# Patient Record
Sex: FEMALE | Race: BLACK OR AFRICAN AMERICAN | NOT HISPANIC OR LATINO | Employment: FULL TIME | URBAN - METROPOLITAN AREA
[De-identification: names, ages, dates, MRNs, and addresses within clinical notes are randomized per-mention and may not be internally consistent; named-entity substitution may affect disease eponyms.]

---

## 2017-06-12 ENCOUNTER — TELEPHONE (OUTPATIENT)
Dept: OBSTETRICS AND GYNECOLOGY | Facility: CLINIC | Age: 37
End: 2017-06-12

## 2017-06-12 NOTE — TELEPHONE ENCOUNTER
Called pt to reschedule her appt to a later time on 6/30 due to Dr Neumann not being available at the provided time.  Pt stated that she rescheduled her appt to August because she is going to be on her cycle around that time.  Confirmed appt.  Pt verbalized understanding.

## 2017-08-09 ENCOUNTER — OFFICE VISIT (OUTPATIENT)
Dept: OBSTETRICS AND GYNECOLOGY | Facility: CLINIC | Age: 37
End: 2017-08-09
Attending: OBSTETRICS & GYNECOLOGY
Payer: COMMERCIAL

## 2017-08-09 VITALS
BODY MASS INDEX: 23.04 KG/M2 | DIASTOLIC BLOOD PRESSURE: 74 MMHG | SYSTOLIC BLOOD PRESSURE: 120 MMHG | HEIGHT: 64 IN | WEIGHT: 134.94 LBS

## 2017-08-09 DIAGNOSIS — Z01.419 VISIT FOR GYNECOLOGIC EXAMINATION: Primary | ICD-10-CM

## 2017-08-09 DIAGNOSIS — Z30.011 ENCOUNTER FOR INITIAL PRESCRIPTION OF CONTRACEPTIVE PILLS: ICD-10-CM

## 2017-08-09 PROCEDURE — 88175 CYTOPATH C/V AUTO FLUID REDO: CPT

## 2017-08-09 PROCEDURE — 87624 HPV HI-RISK TYP POOLED RSLT: CPT

## 2017-08-09 PROCEDURE — 99999 PR PBB SHADOW E&M-EST. PATIENT-LVL III: CPT | Mod: PBBFAC,,, | Performed by: OBSTETRICS & GYNECOLOGY

## 2017-08-09 PROCEDURE — 99395 PREV VISIT EST AGE 18-39: CPT | Mod: S$GLB,,, | Performed by: OBSTETRICS & GYNECOLOGY

## 2017-08-09 RX ORDER — NORETHINDRONE ACETATE AND ETHINYL ESTRADIOL .02; 1 MG/1; MG/1
1 TABLET ORAL DAILY
Qty: 30 TABLET | Refills: 11 | Status: SHIPPED | OUTPATIENT
Start: 2017-08-09 | End: 2018-04-16

## 2017-08-09 NOTE — PROGRESS NOTES
"CC: Well woman exam    Maria Eugenia Briscoe is a 36 y.o. female  presents for a well woman exam.  She has no issues, problems, or complaints.    Past Medical History:   Diagnosis Date    Pregnancy        Past Surgical History:   Procedure Laterality Date     SECTION      MYOMECTOMY      MYOMECTOMY         OB History    Para Term  AB Living   2 1 1   1 1   SAB TAB Ectopic Multiple Live Births   1     0 1      # Outcome Date GA Lbr Patrick/2nd Weight Sex Delivery Anes PTL Lv   2 Term 08/10/15 39w3d  3.05 kg (6 lb 11.6 oz) M CS-LTranv Spinal N PANDA   1 SAB  6w0d                 Family History   Problem Relation Age of Onset    Breast cancer Paternal Aunt     Colon cancer Neg Hx     Ovarian cancer Neg Hx        Social History   Substance Use Topics    Smoking status: Never Smoker    Smokeless tobacco: Never Used    Alcohol use No      Comment: rarely       /74   Ht 5' 4" (1.626 m)   Wt 61.2 kg (134 lb 14.7 oz)   LMP 2017 (Exact Date)   Breastfeeding? No   BMI 23.16 kg/m²     ROS:  GENERAL: Denies weight gain or weight loss. Feeling well overall.   SKIN: Denies rash or lesions.   HEAD: Denies head injury or headache.   NODES: Denies enlarged lymph nodes.   CHEST: Denies chest pain or shortness of breath.   CARDIOVASCULAR: Denies palpitations or left sided chest pain.   ABDOMEN: No abdominal pain, constipation, diarrhea, nausea, vomiting or rectal bleeding.   URINARY: No frequency, dysuria, hematuria, or burning on urination.  REPRODUCTIVE: See HPI.   BREASTS: The patient performs breast self-examination and denies pain, lumps, or nipple discharge.   HEMATOLOGIC: No easy bruisability or excessive bleeding.  MUSCULOSKELETAL: Denies joint pain or swelling.   NEUROLOGIC: Denies syncope or weakness.   PSYCHIATRIC: Denies depression, anxiety or mood swings.    Physical Exam:    APPEARANCE: Well nourished, well developed, in no acute distress.  AFFECT: WNL, alert and " oriented x 3  SKIN: No acne or hirsutism  NECK: Neck symmetric without masses or thyromegaly  NODES: No inguinal, cervical, axillary, or femoral lymph node enlargement  CHEST: Good respiratory effect  ABDOMEN: Soft.  No tenderness or masses.  No hepatosplenomegaly.  No hernias.  BREASTS: Symmetrical, no skin changes or visible lesions.  No palpable masses, nipple discharge bilaterally.  PELVIC: Normal external genitalia without lesions.  Normal hair distribution.  Adequate perineal body, normal urethral meatus.  Vagina moist and well rugated without lesions or discharge.  Cervix pink, without lesions, discharge or tenderness.  No significant cystocele or rectocele.  Bimanual exam shows uterus to be normal size, regular, mobile and nontender.  Adnexa without masses or tenderness.    EXTREMITIES: No edema.    ASSESSMENT AND PLAN  1. Visit for gynecologic examination  Liquid-based pap smear, screening    HPV High Risk Genotypes, PCR   2. Encounter for initial prescription of contraceptive pills  norethindrone-ethinyl estradiol (MICROGESTIN 1/20) 1-20 mg-mcg per tablet       Patient was counseled today on A.C.S. Pap guidelines and recommendations for yearly pelvic exams, pap smears every 5 years with HPV co-testing, and monthly self breast exams; to see her PCP for other health maintenance.     Return in about 1 year (around 8/9/2018).

## 2017-08-14 ENCOUNTER — OFFICE VISIT (OUTPATIENT)
Dept: OPHTHALMOLOGY | Facility: CLINIC | Age: 37
End: 2017-08-14
Payer: COMMERCIAL

## 2017-08-14 DIAGNOSIS — Q14.2 OPTIC DISC ANOMALY: ICD-10-CM

## 2017-08-14 DIAGNOSIS — H40.023 OPEN ANGLE WITH BORDERLINE FINDINGS AND HIGH GLAUCOMA RISK IN BOTH EYES: Primary | ICD-10-CM

## 2017-08-14 PROCEDURE — 92250 FUNDUS PHOTOGRAPHY W/I&R: CPT | Mod: S$GLB,,, | Performed by: OPHTHALMOLOGY

## 2017-08-14 PROCEDURE — 92020 GONIOSCOPY: CPT | Mod: S$GLB,,, | Performed by: OPHTHALMOLOGY

## 2017-08-14 PROCEDURE — 92004 COMPRE OPH EXAM NEW PT 1/>: CPT | Mod: S$GLB,,, | Performed by: OPHTHALMOLOGY

## 2017-08-14 PROCEDURE — 99999 PR PBB SHADOW E&M-EST. PATIENT-LVL II: CPT | Mod: PBBFAC,,, | Performed by: OPHTHALMOLOGY

## 2017-08-14 NOTE — PROGRESS NOTES
HPI     Pt states she seen an eye doctor last week at [facility] and was   referred to see Dr. May.   Pt states the [facility] eye doctor told her that her vision has   decreased and she failed her HVF test.   Pt states she seen Dr. May a while back probably in the 90's for   Ocular hypertension and was put on eye drops but a previous eye doctor   that she has seen told her she didn't need to be on any eye drops.   Pt states she did have a baby 8/10/15 and wasn't sure if this had anything   to do with her vision loss.   Pt states she was prescribed glasses but they haven't came in yet.         Last edited by Shaniqua May MD on 8/14/2017 11:33 AM. (History)            Assessment /Plan     For exam results, see Encounter Report.    Open angle with borderline findings and high glaucoma risk in both eyes    Optic disc anomaly      PT WAS SEEN HERE AT [facility] - (PRE 2004) - AS A GLAUCOMA SUSPECT AT ABOUT 14 YEARS OF AGE  NO TREATMENT WAS STARTED AT THAT TIME   SHE RE-PRESENTS AS A GLAUCOMA SUSPECT TODAY   SHE IS NOW 36 AND HAS A 2 YEAR OLD CHILD     Glaucoma suspect 2/2 large CDR   Seen as a teenager ( about age 14 ) - and there  May be some old slides in storage    Re-presents as continued suspect     CDR 0.85 - thin inf ou  Large disc size   + FmHx - grand mother   ? There may be an old HVF in the perimetry machine   -can try and find old photos in storage - but may not be possible     pland   Disc photos today   F/u with HVF / OCT - 1-2-3 months   Will NOT begin treatment at this time - monitor

## 2017-08-15 LAB
HPV HR 12 DNA CVX QL NAA+PROBE: NEGATIVE
HPV16 DNA SPEC QL NAA+PROBE: NEGATIVE
HPV18 DNA SPEC QL NAA+PROBE: NEGATIVE

## 2017-12-01 ENCOUNTER — CLINICAL SUPPORT (OUTPATIENT)
Dept: OPHTHALMOLOGY | Facility: CLINIC | Age: 37
End: 2017-12-01
Attending: OPHTHALMOLOGY
Payer: COMMERCIAL

## 2017-12-01 DIAGNOSIS — H40.023 OPEN ANGLE WITH BORDERLINE FINDINGS AND HIGH GLAUCOMA RISK IN BOTH EYES: ICD-10-CM

## 2017-12-01 DIAGNOSIS — Z83.511 FAMILY HISTORY OF GLAUCOMA: ICD-10-CM

## 2017-12-01 DIAGNOSIS — H40.1131 PRIMARY OPEN-ANGLE GLAUCOMA, BILATERAL, MILD STAGE: Primary | ICD-10-CM

## 2017-12-01 DIAGNOSIS — Q14.2 OPTIC DISC ANOMALY: ICD-10-CM

## 2017-12-01 PROCEDURE — 99999 PR PBB SHADOW E&M-EST. PATIENT-LVL I: CPT | Mod: PBBFAC,,, | Performed by: OPHTHALMOLOGY

## 2017-12-01 PROCEDURE — 92083 EXTENDED VISUAL FIELD XM: CPT | Mod: S$GLB,,, | Performed by: OPHTHALMOLOGY

## 2017-12-01 PROCEDURE — 92133 CPTRZD OPH DX IMG PST SGM ON: CPT | Mod: S$GLB,,, | Performed by: OPHTHALMOLOGY

## 2017-12-01 PROCEDURE — 92012 INTRM OPH EXAM EST PATIENT: CPT | Mod: S$GLB,,, | Performed by: OPHTHALMOLOGY

## 2017-12-01 RX ORDER — LATANOPROST 50 UG/ML
1 SOLUTION/ DROPS OPHTHALMIC DAILY
Qty: 2.5 ML | Refills: 12 | Status: SHIPPED | OUTPATIENT
Start: 2017-12-01 | End: 2018-09-05

## 2017-12-01 NOTE — PROGRESS NOTES
HPI     Glaucoma    Additional comments: HVF and OCT review today           Comments   DLS: 8/14/17    1. POAG  2. Large CDR       Last edited by Tammi Aldana MA on 12/1/2017 11:00 AM. (History)            Assessment /Plan     For exam results, see Encounter Report.    Primary open-angle glaucoma, bilateral, mild stage  -     latanoprost 0.005 % ophthalmic solution; Place 1 drop into both eyes once daily.  Dispense: 2.5 mL; Refill: 12  -     [facility] Retina Tomography (HRT) - OU - Both Eyes; Future    Optic disc anomaly    Family history of glaucoma        PT WAS SEEN HERE AT [facility] - (PRE 2004) - AS A GLAUCOMA SUSPECT AT ABOUT 14 YEARS OF AGE  NO TREATMENT WAS STARTED AT THAT TIME   SHE RE-PRESENTS AS A GLAUCOMA SUSPECT TODAY   SHE IS NOW 36 AND HAS A 2 YEAR OLD CHILD      Glaucoma suspect 2/2 large CDR   Seen as a teenager ( about age 14 ) - and there  May be some old slides in storage     Re-presents as continued suspect        Glaucoma (type and duration)    Followed as a supspect since 1999 (age 14)   First HVF   1999   First photos   ?   Treatment / Drops started   Drops starte 12/2017 at age 37           Family history    + grandmother         Glaucoma meds    Latanoprost started 12/2017        H/O adverse rxn to glaucoma drops    none        LASERS    none        GLAUCOMA SURGERIES    none        OTHER EYE SURGERIES    none        CDR    0.85/0.85 thin inf        Tbase    18-22 / 19-24          Tmax    22/24            Ttarget    ?             HVF    4 test 1999  to  2002 - near nl  od // near nl  Os    more recently - 1 test 2017 to 2017 - near nl od // ? Hint SNS        Gonio    +3 ou         CCT    585/590        OCT    1 test 2017 to 2017 - RNFL - dec. S od // dec. I os        HRT    ? test 20? to 20? - MR - ? od // ? os        Disc photos    8/2017    - Ttoday    22/24  - Test done today    HVF / OCT        PLAN   Suspect has POAG - mild ou   Begin latanoprost ou   ++ OCT changes and IOP creeping up      F/U 2-3 months with IOP check on latanoprost and HRT and gonio   Will ask photography if they can find any old photos in storage from 1999 to 2002

## 2018-01-04 ENCOUNTER — TELEPHONE (OUTPATIENT)
Dept: OPHTHALMOLOGY | Facility: CLINIC | Age: 38
End: 2018-01-04

## 2018-03-05 ENCOUNTER — OFFICE VISIT (OUTPATIENT)
Dept: OPHTHALMOLOGY | Facility: CLINIC | Age: 38
End: 2018-03-05
Payer: COMMERCIAL

## 2018-03-05 ENCOUNTER — CLINICAL SUPPORT (OUTPATIENT)
Dept: OPHTHALMOLOGY | Facility: CLINIC | Age: 38
End: 2018-03-05
Payer: COMMERCIAL

## 2018-03-05 DIAGNOSIS — Q14.2 OPTIC DISC ANOMALY: ICD-10-CM

## 2018-03-05 DIAGNOSIS — Z83.511 FAMILY HISTORY OF GLAUCOMA: ICD-10-CM

## 2018-03-05 DIAGNOSIS — H40.1131 PRIMARY OPEN-ANGLE GLAUCOMA, BILATERAL, MILD STAGE: ICD-10-CM

## 2018-03-05 DIAGNOSIS — H47.233 OPTIC CUPPING OF BOTH EYES: ICD-10-CM

## 2018-03-05 DIAGNOSIS — H40.1131 PRIMARY OPEN-ANGLE GLAUCOMA, BILATERAL, MILD STAGE: Primary | ICD-10-CM

## 2018-03-05 PROCEDURE — 99999 PR PBB SHADOW E&M-EST. PATIENT-LVL II: CPT | Mod: PBBFAC,,, | Performed by: OPHTHALMOLOGY

## 2018-03-05 PROCEDURE — 92014 COMPRE OPH EXAM EST PT 1/>: CPT | Mod: S$GLB,,, | Performed by: OPHTHALMOLOGY

## 2018-03-05 PROCEDURE — 92134 CPTRZ OPH DX IMG PST SGM RTA: CPT | Mod: S$GLB,,, | Performed by: OPHTHALMOLOGY

## 2018-03-05 NOTE — PROGRESS NOTES
HPI     DLS: 12/01/17    Pt here for HRT review;    Meds: Latanoprost qhs ou    1. POAG   2. Large CDR         Last edited by Africa Bruner on 3/5/2018 11:08 AM. (History)            Assessment /Plan     For exam results, see Encounter Report.    Primary open-angle glaucoma, bilateral, mild stage    Optic cupping of both eyes    Optic disc anomaly    Family history of glaucoma          PT WAS SEEN HERE AT [facility] - (PRE 2004) - AS A GLAUCOMA SUSPECT AT ABOUT 14 YEARS OF AGE  NO TREATMENT WAS STARTED AT THAT TIME   SHE RE-PRESENTS AS A GLAUCOMA SUSPECT TODAY   SHE IS NOW 36 AND HAS A 2 YEAR OLD CHILD      1. Glaucoma suspect 2/2 large CDR   Seen as a teenager ( about age 14 ) - and there  May be some old slides in storage     Re-presents as continued suspect        Glaucoma (type and duration)    Followed as a supspect since 1999 (age 14)   First HVF   1999   First photos   ?   Treatment / Drops started   Drops starte 12/2017 at age 37           Family history    + grandmother         Glaucoma meds    Latanoprost started 12/2017        H/O adverse rxn to glaucoma drops    none        LASERS    none        GLAUCOMA SURGERIES    none        OTHER EYE SURGERIES    none        CDR    0.85/0.85 thin inf        Tbase    18-22 / 19-24          Tmax    22/24            Ttarget    17/17             HVF    4 test 1999  to  2002 - near nl  od // near nl  Os    more recently - 1 test 2017 to 2017 - near nl od // ? Hint SNS        Gonio    +3 ou         CCT    585/590        OCT    1 test 2017 to 2017 - RNFL - dec. S od // dec. I os        HRT    1 test 2018 to 2018 -  MR -  Dec. Thru out  od // dec. S/N/I os /// CDR 0.87  od // 0.84 os /   // Lrg disc area on HRT  4.2od and 4.2 os (nl is 1/7 to 2.8)        Disc photos    8/2017    - Ttoday    16/16 (down from 22/24 off gtts)   - Test done today    HVF / OCT      2. Lrg disc area    Anomalous disc     PLAN    POAG - mild ou vs suspect   lrg CDR - since childhood  lrg Optic disc  area   abnl OCT  abnl HRT   Cont  latanoprost ou   Good resp to latanoprost 22/24 --> 16 ou     F/U 4 months with gonio and IOP check // glaucoma follow up     Will ask photography if they can find any old photos in storage from 1999 to 2002

## 2018-03-21 ENCOUNTER — TELEPHONE (OUTPATIENT)
Dept: OBSTETRICS AND GYNECOLOGY | Facility: CLINIC | Age: 38
End: 2018-03-21

## 2018-03-21 DIAGNOSIS — Z32.01 POSITIVE URINE PREGNANCY TEST: Primary | ICD-10-CM

## 2018-03-21 NOTE — TELEPHONE ENCOUNTER
Returned the pt's call to the clinic. Informed the pt that Dr. Neumann isn't taking any newly pregnant patients and that a message will be sent to her to inquire if she can see the pt as a new ob. Pt verbalized understanding.      Can you see this pt as a new ob?

## 2018-03-21 NOTE — TELEPHONE ENCOUNTER
Returned the pt's call to the clinic. No answer, left VM message for the pt to call the clinic back.      Can you see this pt as a new ob?

## 2018-03-21 NOTE — TELEPHONE ENCOUNTER
----- Message from Oscar Saenz sent at 3/21/2018  8:56 AM CDT -----  Please call new ob pt she has a appt on 05/03 to confirm her pregnancy will like to schedule a appt sooner [phone] or [phone]

## 2018-03-21 NOTE — TELEPHONE ENCOUNTER
Contacted the pt to inform her that Dr. Neumann will see her for her new pregnancy and that she can schedule her appointments up until June. Pt scheduled appointments up until June and informed that letter reminders will be sent out. Pt verbalized understanding. Letter reminders sent out.

## 2018-03-21 NOTE — TELEPHONE ENCOUNTER
----- Message from Layla Barboza sent at 3/21/2018  9:20 AM CDT -----  _  1st Request  X_  2nd Request  _  3rd Request        Who: CHANDLER MELCHOR [[id number]]    Why: Pt is returning a call from the office.Please return the call at earliest convenience. Thanks!    What Number to Call Back:[phone]      When to Expect a call back: (Within 24 hours)

## 2018-03-28 ENCOUNTER — HOSPITAL ENCOUNTER (EMERGENCY)
Facility: OTHER | Age: 38
Discharge: HOME | End: 2018-03-28
Attending: EMERGENCY MEDICINE
Payer: COMMERCIAL

## 2018-03-28 VITALS
DIASTOLIC BLOOD PRESSURE: 56 MMHG | TEMPERATURE: 98 F | BODY MASS INDEX: 22.36 KG/M2 | HEART RATE: 94 BPM | SYSTOLIC BLOOD PRESSURE: 117 MMHG | WEIGHT: 131 LBS | HEIGHT: 64 IN | RESPIRATION RATE: 16 BRPM | OXYGEN SATURATION: 100 %

## 2018-03-28 DIAGNOSIS — O20.9 BLEEDING IN EARLY PREGNANCY: Primary | ICD-10-CM

## 2018-03-28 DIAGNOSIS — O20.0 THREATENED ABORTION: ICD-10-CM

## 2018-03-28 LAB
ABO + RH BLD: NORMAL
ALBUMIN SERPL BCP-MCNC: 3.9 G/DL
ALP SERPL-CCNC: 44 U/L
ALT SERPL W/O P-5'-P-CCNC: 11 U/L
ANION GAP SERPL CALC-SCNC: 9 MMOL/L
AST SERPL-CCNC: 19 U/L
B-HCG UR QL: POSITIVE
BACTERIA #/AREA URNS HPF: ABNORMAL /HPF
BASOPHILS # BLD AUTO: 0.06 K/UL
BASOPHILS NFR BLD: 0.6 %
BILIRUB SERPL-MCNC: 0.3 MG/DL
BILIRUB UR QL STRIP: NEGATIVE
BLD GP AB SCN CELLS X3 SERPL QL: NORMAL
BUN SERPL-MCNC: 10 MG/DL
CALCIUM SERPL-MCNC: 9.1 MG/DL
CHLORIDE SERPL-SCNC: 105 MMOL/L
CLARITY UR: CLEAR
CO2 SERPL-SCNC: 24 MMOL/L
COLOR UR: YELLOW
CREAT SERPL-MCNC: 0.6 MG/DL
CTP QC/QA: YES
DIFFERENTIAL METHOD: ABNORMAL
EOSINOPHIL # BLD AUTO: 0.1 K/UL
EOSINOPHIL NFR BLD: 0.9 %
ERYTHROCYTE [DISTWIDTH] IN BLOOD BY AUTOMATED COUNT: 12.8 %
EST. GFR  (AFRICAN AMERICAN): >60 ML/MIN/1.73 M^2
EST. GFR  (NON AFRICAN AMERICAN): >60 ML/MIN/1.73 M^2
GLUCOSE SERPL-MCNC: 105 MG/DL
GLUCOSE UR QL STRIP: NEGATIVE
HCG INTACT+B SERPL-ACNC: NORMAL MIU/ML
HCT VFR BLD AUTO: 33.9 %
HGB BLD-MCNC: 11.3 G/DL
HGB UR QL STRIP: ABNORMAL
KETONES UR QL STRIP: ABNORMAL
LEUKOCYTE ESTERASE UR QL STRIP: NEGATIVE
LYMPHOCYTES # BLD AUTO: 1.8 K/UL
LYMPHOCYTES NFR BLD: 18.3 %
MCH RBC QN AUTO: 30.9 PG
MCHC RBC AUTO-ENTMCNC: 33.3 G/DL
MCV RBC AUTO: 93 FL
MICROSCOPIC COMMENT: ABNORMAL
MONOCYTES # BLD AUTO: 0.6 K/UL
MONOCYTES NFR BLD: 5.6 %
NEUTROPHILS # BLD AUTO: 7.3 K/UL
NEUTROPHILS NFR BLD: 74.3 %
NITRITE UR QL STRIP: NEGATIVE
PH UR STRIP: 7 [PH] (ref 5–8)
PLATELET # BLD AUTO: 245 K/UL
PMV BLD AUTO: 10.2 FL
POTASSIUM SERPL-SCNC: 3.6 MMOL/L
PROT SERPL-MCNC: 8.1 G/DL
PROT UR QL STRIP: NEGATIVE
RBC # BLD AUTO: 3.66 M/UL
RBC #/AREA URNS HPF: 22 /HPF (ref 0–4)
SODIUM SERPL-SCNC: 138 MMOL/L
SP GR UR STRIP: 1.02 (ref 1–1.03)
SQUAMOUS #/AREA URNS HPF: 5 /HPF
URN SPEC COLLECT METH UR: ABNORMAL
UROBILINOGEN UR STRIP-ACNC: 1 EU/DL
WBC # BLD AUTO: 9.88 K/UL

## 2018-03-28 PROCEDURE — 85025 COMPLETE CBC W/AUTO DIFF WBC: CPT

## 2018-03-28 PROCEDURE — 84702 CHORIONIC GONADOTROPIN TEST: CPT

## 2018-03-28 PROCEDURE — 86850 RBC ANTIBODY SCREEN: CPT

## 2018-03-28 PROCEDURE — 99284 EMERGENCY DEPT VISIT MOD MDM: CPT | Mod: 25

## 2018-03-28 PROCEDURE — 81025 URINE PREGNANCY TEST: CPT | Performed by: PHYSICIAN ASSISTANT

## 2018-03-28 PROCEDURE — 80053 COMPREHEN METABOLIC PANEL: CPT

## 2018-03-28 PROCEDURE — 81000 URINALYSIS NONAUTO W/SCOPE: CPT

## 2018-03-28 NOTE — ED NOTES
"Pt to ED reporting she is 5 weeks pregnant, reporting vaginal spotting began today with dark red blood, unable to quantify amount. Reporting mild abdominal cramping stating "but I think its related to pregnancy pain." Denies back pain, SOB, or CP. Pt AAOx4 and appropriate at this time. Respirations even and unlabored. No acute distress noted.   "

## 2018-03-28 NOTE — ED PROVIDER NOTES
"Encounter Date: 3/28/2018       History     Chief Complaint   Patient presents with    Vaginal Bleeding     Pt reports + 5 wks pregnant new onset " dark colored" vaginal bleeding w/ new onset this AM. + intermittent "normal" pelvic cramping. Denies N/V     37-year-old female with history of glaucoma who is  A1 at approximately 5 weeks gestation presents emergency department with complaints of spotting in pregnancy.  She states it started this morning.  She reports some mild pelvic cramping.  She reports some mild nausea but denies vomiting or diarrhea.  She denies urinary symptoms.  No current treatment for his symptoms.  She is scheduled to follow-up with GYN on .      The history is provided by the patient.     Review of patient's allergies indicates:  No Known Allergies  Past Medical History:   Diagnosis Date    Glaucoma     Pregnancy      Past Surgical History:   Procedure Laterality Date     SECTION      MYOMECTOMY      MYOMECTOMY  2010     Family History   Problem Relation Age of Onset    Breast cancer Paternal Aunt     Glaucoma Father     Glaucoma Paternal Grandmother     Colon cancer Neg Hx     Ovarian cancer Neg Hx     Amblyopia Neg Hx     Blindness Neg Hx     Cataracts Neg Hx     Macular degeneration Neg Hx     Retinal detachment Neg Hx     Strabismus Neg Hx      Social History   Substance Use Topics    Smoking status: Never Smoker    Smokeless tobacco: Never Used    Alcohol use No      Comment: rarely     Review of Systems   Constitutional: Negative for chills and fever.   HENT: Negative for sore throat.    Respiratory: Negative for shortness of breath.    Cardiovascular: Negative for chest pain.   Gastrointestinal: Positive for nausea. Negative for abdominal pain, diarrhea and vomiting.   Genitourinary: Positive for vaginal bleeding. Negative for difficulty urinating, dysuria, flank pain, frequency, hematuria and urgency.   Musculoskeletal: Negative for back pain. "   Skin: Negative for rash.   Neurological: Negative for weakness.   Hematological: Does not bruise/bleed easily.       Physical Exam     Initial Vitals [03/28/18 1518]   BP Pulse Resp Temp SpO2   (!) 115/58 89 16 98.2 °F (36.8 °C) 100 %      MAP       77         Physical Exam    Nursing note and vitals reviewed.  Constitutional: Vital signs are normal. She appears well-developed and well-nourished. She is not diaphoretic.  Non-toxic appearance. No distress.   HENT:   Head: Normocephalic and atraumatic.   Right Ear: External ear normal.   Left Ear: External ear normal.   Nose: Nose normal.   Eyes: Conjunctivae, EOM and lids are normal. Pupils are equal, round, and reactive to light. No scleral icterus.   Neck: Normal range of motion and phonation normal. Neck supple.   Abdominal: Soft. Normal appearance and bowel sounds are normal. She exhibits no distension. There is no tenderness. There is no rigidity, no rebound, no guarding, no CVA tenderness, no tenderness at McBurney's point and negative Domingo's sign.   Genitourinary: Pelvic exam was performed with patient supine. There is no rash or tenderness on the right labia. There is no rash or tenderness on the left labia. There is bleeding (scant dried blood in vaginal vault) in the vagina.   Musculoskeletal: Normal range of motion.   No obvious deformities, moving all extremities, normal gait   Neurological: She is alert and oriented to person, place, and time. She has normal strength. No sensory deficit.   Skin: Skin is warm, dry and intact. No lesion and no rash noted. No erythema.   Psychiatric: She has a normal mood and affect. Her speech is normal and behavior is normal. Judgment normal. Cognition and memory are normal.         ED Course   Procedures  Labs Reviewed   CBC W/ AUTO DIFFERENTIAL - Abnormal; Notable for the following:        Result Value    RBC 3.66 (*)     Hemoglobin 11.3 (*)     Hematocrit 33.9 (*)     Gran% 74.3 (*)     All other components within  normal limits   COMPREHENSIVE METABOLIC PANEL - Abnormal; Notable for the following:     Alkaline Phosphatase 44 (*)     All other components within normal limits   URINALYSIS - Abnormal; Notable for the following:     Ketones, UA 1+ (*)     Occult Blood UA 3+ (*)     All other components within normal limits   URINALYSIS MICROSCOPIC - Abnormal; Notable for the following:     RBC, UA 22 (*)     All other components within normal limits   POCT URINE PREGNANCY - Abnormal; Notable for the following:     POC Preg Test, Ur Positive (*)     All other components within normal limits   HCG, QUANTITATIVE, PREGNANCY   TYPE & SCREEN              Imaging Results          US OB Less Than 14 Wks with Transvag(xpd (Final result)  Result time 03/28/18 17:44:15    Final result by Bc So MD (03/28/18 17:44:15)                 Impression:      Single intrauterine gestational sac with an estimated age of 6 weeks and 3 days, containing only a small yolk sac, which may be secondary to very early gestation.  Follow-up with serial beta HCG and pelvic ultrasound as clinically warranted.    Probable small subchorionic hematoma without significant mass effect.      Electronically signed by: Bc So MD  Date:    03/28/2018  Time:    17:44             Narrative:    EXAMINATION:  US OB LESS THAN 14 WKS WITH TRANSVAGINAL (XPD)    CLINICAL HISTORY:  Vag Bleeding;    TECHNIQUE:  Transabdominal sonography of the pelvis was performed, followed by transvaginal sonography to better evaluate the uterus and ovaries.    COMPARISON:  Pelvic ultrasound 12/17/2014    FINDINGS:  Intrauterine gestation(s): Single    Mean gestational sac diameter: 1.6 cm    Yolk sac: 3-4 mm    No fetal pole identified.    Subchorionic hemorrhage: There is a 0.6 x 0.4 x 0.5 cm irregular hypoechoic collection near the gestational sac suggestive of a small subchorionic hematoma.    Right ovary: 3.7 x 2.1 x 2.3 cm with intact arterial and venous flow.    Left ovary:  3.1 x 3.8 x 2.4 cm with intact arterial and venous flow containing a 2.7 cm probable corpus luteum.    Miscellaneous: No free fluid within the cul de sac or Morison's pouch.                                Medical Decision Making:   History:   Old Medical Records: I decided to obtain old medical records.  Initial Assessment:   37-year-old female with complaints consistent with vaginal bleeding in early pregnancy concerning for threatened .  Vital signs stable, afebrile, neurovascularly intact.  She is alert, healthy and nontoxic appearing.  She is in no apparent distress.  No focal neurological deficits.  Exam documented above.  Clinical Tests:   Lab Tests: Ordered and Reviewed  Radiological Study: Ordered and Reviewed  ED Management:  Workup obtained to rule out ectopic pregnancy.  H&H stable at 11.3 and 33.9.  [place] [address].  She is A+ and does not require RhoGAM.  Ultrasound consistent with single intrauterine gestational sac with an estimated age of 6 weeks 3 days containing a small yolk sac.  She does have a probable small subchorionic hematoma without mass effect.  She is stable will be discharged home with care instructions.  She is to follow-up with OB/GYN in the next 48 hours or return for any worsening signs or symptoms.  She states understanding.  This patient was discussed with the attending physician who agrees with treatment plan  Other:   I have discussed this case with another health care provider.       <> Summary of the Discussion: [place]  This note was created using Dragon Medical dictation.  There may be typographical errors secondary to dictation.                        Clinical Impression:     1. Bleeding in early pregnancy    2. Threatened           Disposition:   Disposition: Discharged  Condition: Stable                        Franchesca Lowery PA-C  [phone]

## 2018-03-29 ENCOUNTER — TELEPHONE (OUTPATIENT)
Dept: OBSTETRICS AND GYNECOLOGY | Facility: CLINIC | Age: 38
End: 2018-03-29

## 2018-03-29 NOTE — TELEPHONE ENCOUNTER
----- Message from Mali Rao sent at 3/29/2018  8:26 AM CDT -----  Contact: self  OB pt 5 weeks, pt went to the ER last night and needing a follow up appt, pt can be reached at [phone].

## 2018-03-29 NOTE — TELEPHONE ENCOUNTER
Pt was seen in [facility] ER for bleeding. Pt stated that it was moderate spotting and was informed by the ER physician to follow up with Dr. Neumann in two days. Informed the pt that Dr. Neumann is out of the office until Wednesday 4/4 and that a message will be sent to her for her recommendations. Pt informed that she will be contacted once Dr. Neumann responds. Pt verbalized understanding.      Any recommendations for this patient?

## 2018-03-30 ENCOUNTER — PATIENT MESSAGE (OUTPATIENT)
Dept: OBSTETRICS AND GYNECOLOGY | Facility: CLINIC | Age: 38
End: 2018-03-30

## 2018-03-31 NOTE — TELEPHONE ENCOUNTER
I have reviewed the ED chart.  Patient doesn't need to be seen.  Message sent through [facility].  Please call her to make sure she read it and see if she has any questions.

## 2018-04-02 NOTE — TELEPHONE ENCOUNTER
Spoke with pt. Pt has read the [facility] message. No further questions or concerns at this time will CB if needed

## 2018-04-16 ENCOUNTER — PROCEDURE VISIT (OUTPATIENT)
Dept: OBSTETRICS AND GYNECOLOGY | Facility: CLINIC | Age: 38
End: 2018-04-16
Attending: OBSTETRICS & GYNECOLOGY
Payer: COMMERCIAL

## 2018-04-16 ENCOUNTER — PATIENT MESSAGE (OUTPATIENT)
Dept: OBSTETRICS AND GYNECOLOGY | Facility: CLINIC | Age: 38
End: 2018-04-16

## 2018-04-16 ENCOUNTER — LAB VISIT (OUTPATIENT)
Dept: LAB | Facility: OTHER | Age: 38
End: 2018-04-16
Payer: COMMERCIAL

## 2018-04-16 ENCOUNTER — OFFICE VISIT (OUTPATIENT)
Dept: OBSTETRICS AND GYNECOLOGY | Facility: CLINIC | Age: 38
End: 2018-04-16
Payer: COMMERCIAL

## 2018-04-16 VITALS
SYSTOLIC BLOOD PRESSURE: 122 MMHG | DIASTOLIC BLOOD PRESSURE: 70 MMHG | BODY MASS INDEX: 23.6 KG/M2 | HEIGHT: 64 IN | WEIGHT: 138.25 LBS

## 2018-04-16 DIAGNOSIS — N91.2 AMENORRHEA: Primary | ICD-10-CM

## 2018-04-16 DIAGNOSIS — Z98.890 HISTORY OF MYOMECTOMY: ICD-10-CM

## 2018-04-16 DIAGNOSIS — Z36.89 ESTABLISH GESTATIONAL AGE, ULTRASOUND: ICD-10-CM

## 2018-04-16 DIAGNOSIS — Z32.01 POSITIVE PREGNANCY TEST: ICD-10-CM

## 2018-04-16 DIAGNOSIS — N92.6 MISSED MENSES: ICD-10-CM

## 2018-04-16 DIAGNOSIS — Z98.891 HISTORY OF CESAREAN SECTION: ICD-10-CM

## 2018-04-16 DIAGNOSIS — N91.2 AMENORRHEA: ICD-10-CM

## 2018-04-16 DIAGNOSIS — O36.80X0 ENCOUNTER TO DETERMINE FETAL VIABILITY OF PREGNANCY, SINGLE OR UNSPECIFIED FETUS: ICD-10-CM

## 2018-04-16 LAB
ABO + RH BLD: NORMAL
ANION GAP SERPL CALC-SCNC: 9 MMOL/L
B-HCG UR QL: POSITIVE
BASOPHILS # BLD AUTO: 0.03 K/UL
BASOPHILS NFR BLD: 0.3 %
BLD GP AB SCN CELLS X3 SERPL QL: NORMAL
BUN SERPL-MCNC: 9 MG/DL
CALCIUM SERPL-MCNC: 9.4 MG/DL
CHLORIDE SERPL-SCNC: 104 MMOL/L
CO2 SERPL-SCNC: 23 MMOL/L
CREAT SERPL-MCNC: 0.6 MG/DL
CTP QC/QA: YES
DIFFERENTIAL METHOD: ABNORMAL
EOSINOPHIL # BLD AUTO: 0.1 K/UL
EOSINOPHIL NFR BLD: 0.6 %
ERYTHROCYTE [DISTWIDTH] IN BLOOD BY AUTOMATED COUNT: 13 %
EST. GFR  (AFRICAN AMERICAN): >60 ML/MIN/1.73 M^2
EST. GFR  (NON AFRICAN AMERICAN): >60 ML/MIN/1.73 M^2
GLUCOSE SERPL-MCNC: 79 MG/DL
HCT VFR BLD AUTO: 35.2 %
HGB BLD-MCNC: 11.9 G/DL
LYMPHOCYTES # BLD AUTO: 1.6 K/UL
LYMPHOCYTES NFR BLD: 14.7 %
MCH RBC QN AUTO: 32.1 PG
MCHC RBC AUTO-ENTMCNC: 33.8 G/DL
MCV RBC AUTO: 95 FL
MONOCYTES # BLD AUTO: 0.5 K/UL
MONOCYTES NFR BLD: 4.6 %
NEUTROPHILS # BLD AUTO: 8.6 K/UL
NEUTROPHILS NFR BLD: 79.7 %
PLATELET # BLD AUTO: 253 K/UL
PMV BLD AUTO: 10.7 FL
POTASSIUM SERPL-SCNC: 3.4 MMOL/L
RBC # BLD AUTO: 3.71 M/UL
SODIUM SERPL-SCNC: 136 MMOL/L
WBC # BLD AUTO: 10.78 K/UL

## 2018-04-16 PROCEDURE — 99213 OFFICE O/P EST LOW 20 MIN: CPT | Mod: 25,S$GLB,, | Performed by: NURSE PRACTITIONER

## 2018-04-16 PROCEDURE — 86592 SYPHILIS TEST NON-TREP QUAL: CPT

## 2018-04-16 PROCEDURE — 80048 BASIC METABOLIC PNL TOTAL CA: CPT

## 2018-04-16 PROCEDURE — 87491 CHLMYD TRACH DNA AMP PROBE: CPT

## 2018-04-16 PROCEDURE — 36415 COLL VENOUS BLD VENIPUNCTURE: CPT

## 2018-04-16 PROCEDURE — 85025 COMPLETE CBC W/AUTO DIFF WBC: CPT

## 2018-04-16 PROCEDURE — 81025 URINE PREGNANCY TEST: CPT | Mod: S$GLB,,, | Performed by: NURSE PRACTITIONER

## 2018-04-16 PROCEDURE — 87340 HEPATITIS B SURFACE AG IA: CPT

## 2018-04-16 PROCEDURE — 87086 URINE CULTURE/COLONY COUNT: CPT

## 2018-04-16 PROCEDURE — 76817 TRANSVAGINAL US OBSTETRIC: CPT | Mod: S$GLB,,, | Performed by: OBSTETRICS & GYNECOLOGY

## 2018-04-16 PROCEDURE — 86762 RUBELLA ANTIBODY: CPT

## 2018-04-16 PROCEDURE — 86703 HIV-1/HIV-2 1 RESULT ANTBDY: CPT

## 2018-04-16 PROCEDURE — 86901 BLOOD TYPING SEROLOGIC RH(D): CPT

## 2018-04-16 PROCEDURE — 99999 PR PBB SHADOW E&M-EST. PATIENT-LVL III: CPT | Mod: PBBFAC,,, | Performed by: NURSE PRACTITIONER

## 2018-04-16 NOTE — PROGRESS NOTES
CC: Positive Pregnancy Test    HISTORY OF PRESENT ILLNESS:    Maria Eugenia Briscoe is a 37 y.o. female, ,  Presents today for a routine exam complaining of amenorrhea and positive home urine pregnancy test. LMP 18. Reports nausea but no vomiting. Reports breast tenderness. Denies pelvic pain. No vaginal bleeding. Prior c/s for hx of myomectomy. Reports not tolerating pelvic exams very well. Has ultrasound after this appointment. Needs prenatal vitamin    SAB x 1  C/s x 1 in 2015- BOY    LMP: 18  EGA: 8w2d  EDC: 18    ROS:  GENERAL: No weight changes. No swelling. No fatigue. No fever.  CARDIOVASCULAR: No chest pain. No shortness of breath. No leg cramps.   NEUROLOGICAL: No headaches. No vision changes.  BREASTS: No pain. No lumps. No discharge.  ABDOMEN: No pain. No diarrhea. No constipation.  REPRODUCTIVE: No abnormal bleeding.   VULVA: No pain. No lesions. No itching.  VAGINA: No relaxation. No itching. No odor. No discharge. No lesions.  URINARY: No incontinence. No nocturia. No frequency. No dysuria.    MEDICATIONS AND ALLERGIES:  Reviewed    COMPREHENSIVE GYN HISTORY:  PAP History: Denies abnormal Paps.  Infection History: Denies STDs. Denies PID.  Benign History: Denies uterine fibroids. Denies ovarian cysts. Denies endometriosis. Denies other conditions.  Cancer History: Denies cervical cancer. Denies uterine cancer or hyperplasia. Denies ovarian cancer. Denies vulvar cancer or pre-cancer. Denies vaginal cancer or pre-cancer. Denies breast cancer. Denies colon cancer.  Sexual Activity History: Reports currently being sexually active  Menstrual History: None.  Contraception: None    There were no vitals taken for this visit.    PE:  AFFECT: Calm, alert and oriented X 3. Interactive during exam  GENERAL: Appears well-nourished, well-developed, in no acute distress.  HEAD: Normocephalic, atruamatic  TEETH: Good dentition.  THYROID: No thyromegally   BREASTS: No masses, skin changes,  nipple discharge or adenopathy bilaterally.  SKIN: Normal for race, warm, & dry. No lesions or rashes.  LUNGS: Easy and unlabored, clear to auscultation bilaterally.  HEART: Regular rate and rhythm   ABDOMEN: Soft and nontender without masses or organomegally.  VULVA: No lesions, masses or tenderness.  VAGINA: Moist and well rugated without lesions or discharge.  CERVIX: Moist and pink without lesions, discharge or tenderness.      UTERUS SIZE: Pt unable to tolerate SVE.   EXTREMITIES: No cyanosis, clubbing or edema. No calf tenderness.  LYMPH NODES: No axillary or inguinal adenopathy.    PROCEDURES:  UPT Positive  Genprobe  Pap current- 2017 WNL    ASSESSMENT/PLAN:  Amenorrhea  Positive urine pregnancy test (NAVARRO: 18, EGA: 8w2d based on LMP)    -  Routine prenatal care    Nausea and vomiting in pregnancy    -  Education regarding lifestyle and dietary modifications    -  Advised use of B6/Unisom. Pt will notify us if no relief/worsening symptoms, will consider Zofran if needed.      1st TRIMESTER COUNSELING: Discussed all, booklet provided:  Common complaints of pregnancy  HIV and other routine prenatal tests including  genetic screening  Risk factors identified by prenatal history  Oriented to practice - discussed anticipated course of prenatal care & indications for Ultrasound  Childbirth classes/Hospital facilities   Nutrition and weight gain counseling  Toxoplasmosis precautions (Cats/Raw Meat)  Sexual activity and exercise  Environmental/Work hazards  Travel  Tobacco (Ask, Advise, Assess, Assist, and Arrange), as well as alcohol and drug use  Use of any medications (Including supplements, Vitamins, Herbs, or OTC Drugs)  Domestic violence  Seat belt use    TERATOLOGY COUNSELING:   Discussed indications and options for aneuploidy screening - pamphlets given   -wants NT    FOLLOW-UP in 4 weeks with Dr. Neumann  Dating u/s and IOB labs today  Rx prenatal vitamin  NT ordered    Yamel Hartley,  NP  OB/GYN

## 2018-04-17 LAB
BACTERIA UR CULT: NORMAL
C TRACH DNA SPEC QL NAA+PROBE: NOT DETECTED
HBV SURFACE AG SERPL QL IA: NEGATIVE
HIV 1+2 AB+HIV1 P24 AG SERPL QL IA: NEGATIVE
N GONORRHOEA DNA SPEC QL NAA+PROBE: NOT DETECTED
RPR SER QL: NORMAL
RUBV IGG SER-ACNC: 22.1 IU/ML
RUBV IGG SER-IMP: REACTIVE

## 2018-05-14 ENCOUNTER — OFFICE VISIT (OUTPATIENT)
Dept: MATERNAL FETAL MEDICINE | Facility: CLINIC | Age: 38
End: 2018-05-14
Attending: OBSTETRICS & GYNECOLOGY
Payer: COMMERCIAL

## 2018-05-14 ENCOUNTER — INITIAL PRENATAL (OUTPATIENT)
Dept: OBSTETRICS AND GYNECOLOGY | Facility: CLINIC | Age: 38
End: 2018-05-14
Attending: OBSTETRICS & GYNECOLOGY
Payer: COMMERCIAL

## 2018-05-14 ENCOUNTER — LAB VISIT (OUTPATIENT)
Dept: LAB | Facility: OTHER | Age: 38
End: 2018-05-14
Attending: OBSTETRICS & GYNECOLOGY
Payer: COMMERCIAL

## 2018-05-14 VITALS — DIASTOLIC BLOOD PRESSURE: 70 MMHG | WEIGHT: 142 LBS | SYSTOLIC BLOOD PRESSURE: 104 MMHG | BODY MASS INDEX: 24.37 KG/M2

## 2018-05-14 VITALS — BODY MASS INDEX: 24.56 KG/M2 | WEIGHT: 143.06 LBS

## 2018-05-14 DIAGNOSIS — Z98.891 HISTORY OF CESAREAN SECTION: ICD-10-CM

## 2018-05-14 DIAGNOSIS — Z36.82 ENCOUNTER FOR NUCHAL TRANSLUCENCY TESTING: ICD-10-CM

## 2018-05-14 DIAGNOSIS — O09.521 ELDERLY MULTIGRAVIDA IN FIRST TRIMESTER: ICD-10-CM

## 2018-05-14 DIAGNOSIS — Z34.90 NORMAL REPEAT PREGNANCY, ANTEPARTUM: Primary | ICD-10-CM

## 2018-05-14 DIAGNOSIS — Z98.890 HISTORY OF MYOMECTOMY: ICD-10-CM

## 2018-05-14 DIAGNOSIS — N91.2 AMENORRHEA: ICD-10-CM

## 2018-05-14 DIAGNOSIS — Z36.89 ENCOUNTER FOR FETAL ANATOMIC SURVEY: Primary | ICD-10-CM

## 2018-05-14 DIAGNOSIS — Z32.01 POSITIVE PREGNANCY TEST: ICD-10-CM

## 2018-05-14 PROCEDURE — 99499 UNLISTED E&M SERVICE: CPT | Mod: S$GLB,,, | Performed by: OBSTETRICS & GYNECOLOGY

## 2018-05-14 PROCEDURE — 99999 PR PBB SHADOW E&M-EST. PATIENT-LVL II: CPT | Mod: PBBFAC,,, | Performed by: OBSTETRICS & GYNECOLOGY

## 2018-05-14 PROCEDURE — 81508 FTL CGEN ABNOR TWO PROTEINS: CPT

## 2018-05-14 PROCEDURE — 36415 COLL VENOUS BLD VENIPUNCTURE: CPT

## 2018-05-14 PROCEDURE — 0502F SUBSEQUENT PRENATAL CARE: CPT | Mod: S$GLB,,, | Performed by: OBSTETRICS & GYNECOLOGY

## 2018-05-14 PROCEDURE — 76813 OB US NUCHAL MEAS 1 GEST: CPT | Mod: S$GLB,,, | Performed by: OBSTETRICS & GYNECOLOGY

## 2018-05-14 PROCEDURE — 99999 PR PBB SHADOW E&M-EST. PATIENT-LVL I: CPT | Mod: PBBFAC,,,

## 2018-05-14 NOTE — PROGRESS NOTES
No problems.  PNL results reviewed.  Ultrasound reviewed - was measuring +6 days ahead, recommended adjusting NAVARRO.  Discussed AMA - NT today.  [facility]cted [facility] offered - patient desires.  Bleeding/pain precautions.

## 2018-05-14 NOTE — LETTER
May 16, 2018      Kristen Neumann MD  [address] [facility]  [address]  [place] [address]           [facility]  [address]  [place] [address]  Phone: [phone]          Patient: Maria Eugenia Briscoe   MR Number: [id number]   YOB: 1980   Date of Visit: 5/14/2018       Dear Dr. Kristen Neumann:    Thank you for referring Maria Eugenia Briscoe to me for evaluation. Attached you will find relevant portions of my assessment and plan of care.    If you have questions, please do not hesitate to call me. I look forward to following Maria Eugenia Briscoe along with you.    Sincerely,    Irma Bryant MD    Enclosure  CC:  No Recipients    If you would like to receive this communication electronically, please contact [email or web address] or [phone] to request more information on [employer] Link access.    For providers and/or their staff who would like to refer a patient to [facility], please contact us through our one-stop-shop provider referral line, [facility], at [phone].    If you feel you have received this communication in error or would no longer like to receive these types of communications, please e-mail [email or web address]

## 2018-05-16 LAB
# FETUSES US: NORMAL
AGE AT DELIVERY: 37
B-HCG MOM SERPL: NORMAL
B-HCG SERPL-ACNC: 53.6 IU/ML
FET CRL US.MEAS: 69.2 MM
FET NASAL BONE LENGTH US.MEAS: NORMAL MM
FET NUCHAL FOLD MOM THICKNESS US.MEAS: NORMAL
FET NUCHAL FOLD THICKNESS US.MEAS: 1.3 MM
FET TS 21 RISK FROM MAT AGE: NORMAL
GA (DAYS): 1 D
GA (WEEKS): 13 WK
IDDM PATIENT QL: NORMAL
INTEGRATED SCN PATIENT-IMP: NEGATIVE
PAPP-A MOM SERPL: NORMAL
PAPP-A SERPL-MCNC: NORMAL NG/ML
SEQUENTIAL SCREEN I INTERP.: NORMAL
SMOKING STATUS FTND: NO
TS 18 RISK FETUS: NORMAL
TS 21 RISK FETUS: NORMAL
US DATE: NORMAL

## 2018-05-17 NOTE — PROGRESS NOTES
Indication  ========    AMA: nuchal translucency screening (Dr. Neumann).    History  ======    General History  Other: AMA: declined cell free DNA screening    Method  ======    Voluson E10, Transabdominal ultrasound examination. View: Good view.    Pregnancy  =========    Hammond pregnancy. Number of fetuses: 1.    Dating  ======    Cycle: regular cycle  Ultrasound examination on: 5/14/2018  GA by U/S based upon: CRL  GA by U/S 13 w + 1 d  NAVARRO by U/S: 11/18/2018  Assigned: Dating performed on 04/16/2018, based on the LMP  Assigned GA 12 w + 2 d  Assigned NAVARRO: 11/24/2018    General Evaluation  ==============    Cardiac activity: present.  Cord vessels: 3 vessel cord.  Amniotic fluid: normal amount.    Fetal Biometry  ============    CRL 69.2 mm 13w 1d Hadlock  NT 1.3 mm   bpm    Fetal Anatomy  ===========    The following structures appear normal:  Cranium. Neck. Thorax. Abdominal wall.    The following structures were visualized:  GI tract. Urogenital tract. Arms. Legs.    Maternal Structures  ===============    Uterus / Cervix  Uterus: Normal  Ovaries / Tubes / Adnexa  Rt ovary: Visualized  Rt ovary D1 26.9 cm  Rt ovary D2 21.3 cm  Rt ovary D3 20.1 cm  Rt ovary mean 22.8 cm  Rt ovary vol 6,030.1 cm³  Lt ovary: Visualized  Lt ovarian corpus luteum: visualized  Lt ovary D1 3.3 cm  Lt ovary D2 2.2 cm  Lt ovary D3 2.4 cm  Lt ovary mean 2.6 cm  Lt ovary vol 9.1 cm³  Lt ovarian corpus luteum D1 15.0 mm  Lt ovarian corpus luteum D2 12.0 mm  Lt ovarian corpus luteum D3 12.0 mm  Pouch of Bayron / Other Structures  Cul de Sac: Visualized    Impression  =========    1. 12w 2d hammond intrauterine pregnancy  2. CRL c/w dates  3. NT measurement obtained .    Recommendation  ==============    AMA:  -patient pursuing NT screen: [facility] staff assist in pursuit  -recommend a targeted fetal anatomical survey at 19-20 wks gestation: to be scheduled by [facility]  -recommend an ultrasound at 32 wks gestation for interval fetal  growth, MVP, and overall fetal well being  -consider daily aspirin 81 mg after 12 wks gestation to reduce risk of developing preeclampsia .

## 2018-06-11 ENCOUNTER — ROUTINE PRENATAL (OUTPATIENT)
Dept: OBSTETRICS AND GYNECOLOGY | Facility: CLINIC | Age: 38
End: 2018-06-11
Attending: OBSTETRICS & GYNECOLOGY
Payer: COMMERCIAL

## 2018-06-11 ENCOUNTER — LAB VISIT (OUTPATIENT)
Dept: LAB | Facility: OTHER | Age: 38
End: 2018-06-11
Attending: OBSTETRICS & GYNECOLOGY
Payer: COMMERCIAL

## 2018-06-11 VITALS
SYSTOLIC BLOOD PRESSURE: 104 MMHG | DIASTOLIC BLOOD PRESSURE: 60 MMHG | WEIGHT: 147.25 LBS | BODY MASS INDEX: 25.28 KG/M2

## 2018-06-11 DIAGNOSIS — Z98.890 HISTORY OF MYOMECTOMY: ICD-10-CM

## 2018-06-11 DIAGNOSIS — O09.521 ELDERLY MULTIGRAVIDA IN FIRST TRIMESTER: ICD-10-CM

## 2018-06-11 DIAGNOSIS — Z98.891 HISTORY OF CESAREAN SECTION: ICD-10-CM

## 2018-06-11 DIAGNOSIS — Z34.90 NORMAL REPEAT PREGNANCY, ANTEPARTUM: ICD-10-CM

## 2018-06-11 DIAGNOSIS — Z34.90 NORMAL REPEAT PREGNANCY, ANTEPARTUM: Primary | ICD-10-CM

## 2018-06-11 PROCEDURE — 99999 PR PBB SHADOW E&M-EST. PATIENT-LVL II: CPT | Mod: PBBFAC,,, | Performed by: OBSTETRICS & GYNECOLOGY

## 2018-06-11 PROCEDURE — 36415 COLL VENOUS BLD VENIPUNCTURE: CPT

## 2018-06-11 PROCEDURE — 81511 FTL CGEN ABNOR FOUR ANAL: CPT

## 2018-06-11 PROCEDURE — 0502F SUBSEQUENT PRENATAL CARE: CPT | Mod: S$GLB,,, | Performed by: OBSTETRICS & GYNECOLOGY

## 2018-06-11 NOTE — PROGRESS NOTES
No problems.  2nd part of SS today.  MFM sono ordered.  Is enrolling in [facility].  DM Screen, CBC with next visit at 24 weeks.  Bleeding/pain precautions.

## 2018-06-13 LAB
# FETUSES US: NORMAL
AFP MOM SERPL: 1.22
AFP SERPL-MCNC: 54.5 NG/ML
AGE AT DELIVERY: 37
B-HCG MOM SERPL: 0.78
B-HCG SERPL-ACNC: 24.2 IU/ML
COLLECT DATE BLD: NORMAL
COLLECT DATE: NORMAL
FET NASAL BONE LENGTH US.MEAS: NORMAL MM
FET NUCHAL FOLD MOM THICKNESS US.MEAS: 0.81
FET NUCHAL FOLD THICKNESS US.MEAS: 1.3 MM
FET TS 21 RISK FROM MAT AGE: NORMAL
GA (DAYS): 1 D
GA (WEEKS): 17 WK
GA METHOD: NORMAL
GEST. AGE (DAYS) 2ND SAMPLE (SS2): 1
GEST. AGE (WKS) 1ST SAMPLE (SS2): 13
IDDM PATIENT QL: NORMAL
INHIBIN A MOM SERPL: 0.29
INHIBIN A SERPL-MCNC: 42.9 PG/ML
INTEGRATED SCN PATIENT-IMP: NEGATIVE
PAPP-A MOM SERPL: 1.73
PAPP-A SERPL-MCNC: NORMAL NG/ML
SEQUENTIAL SCREEN PART 2 INTERP: NORMAL
TS 18 RISK FETUS: NORMAL
TS 21 RISK FETUS: NORMAL
U ESTRIOL MOM SERPL: 1.16
U ESTRIOL SERPL-MCNC: 1.15 NG/ML

## 2018-06-15 ENCOUNTER — PATIENT OUTREACH (OUTPATIENT)
Dept: OTHER | Facility: OTHER | Age: 38
End: 2018-06-15

## 2018-06-15 ENCOUNTER — PATIENT MESSAGE (OUTPATIENT)
Dept: ADMINISTRATIVE | Facility: OTHER | Age: 38
End: 2018-06-15

## 2018-06-15 NOTE — TELEPHONE ENCOUNTER
Initial introduction with Ms. Maria Eugenia Briscoe completed and the role of the health coaches for [facility] was explained.     Reviewed the importance of taking weights and blood pressure readings, using the health  as a resource for physical activity and dietary habits, and that [facility] messages will be sent for weeks 20, 30, and 37 home urine tests.  Reviewed that the patient should contact her OB team with any specific questions regarding her pregnancy, and to contact [facility] or 911 in the case of a medical emergency.

## 2018-07-01 ENCOUNTER — PATIENT MESSAGE (OUTPATIENT)
Dept: ADMINISTRATIVE | Facility: OTHER | Age: 38
End: 2018-07-01

## 2018-07-02 ENCOUNTER — OFFICE VISIT (OUTPATIENT)
Dept: MATERNAL FETAL MEDICINE | Facility: CLINIC | Age: 38
End: 2018-07-02
Attending: OBSTETRICS & GYNECOLOGY
Payer: COMMERCIAL

## 2018-07-02 DIAGNOSIS — O09.522 ELDERLY MULTIGRAVIDA IN SECOND TRIMESTER: ICD-10-CM

## 2018-07-02 DIAGNOSIS — Z36.89 ENCOUNTER FOR FETAL ANATOMIC SURVEY: ICD-10-CM

## 2018-07-02 DIAGNOSIS — Z36.89 ENCOUNTER FOR ULTRASOUND TO CHECK FETAL GROWTH: Primary | ICD-10-CM

## 2018-07-02 DIAGNOSIS — O34.29 PREGNANCY WITH HISTORY OF UTERINE MYOMECTOMY: ICD-10-CM

## 2018-07-02 DIAGNOSIS — O34.219 PREGNANCY WITH HISTORY OF CESAREAN SECTION, ANTEPARTUM: ICD-10-CM

## 2018-07-02 PROCEDURE — 99499 UNLISTED E&M SERVICE: CPT | Mod: S$GLB,,, | Performed by: OBSTETRICS & GYNECOLOGY

## 2018-07-02 PROCEDURE — 76811 OB US DETAILED SNGL FETUS: CPT | Mod: S$GLB,,, | Performed by: OBSTETRICS & GYNECOLOGY

## 2018-07-02 NOTE — LETTER
July 5, 2018      Kristen Neumann MD  [address]  [address]  [facility] [address]           [facility]  [address]  [facility] [address]  Phone: [phone]          Patient: Maria Eugenia Briscoe   MR Number: [id number]   YOB: 1980   Date of Visit: 7/2/2018       Dear Dr. Kristen Neumann:    Thank you for referring Maria Eugenia Briscoe to me for evaluation. Attached you will find relevant portions of my assessment and plan of care.    If you have questions, please do not hesitate to call me. I look forward to following Maria Eugenia Briscoe along with you.    Sincerely,    Debra Noe RN    Enclosure  CC:  No Recipients    If you would like to receive this communication electronically, please contact [email or web address] or [phone] to request more information on [email or web address] Link access.    For providers and/or their staff who would like to refer a patient to [facility], please contact us through our one-stop-shop provider referral line, [facility], at [phone].    If you feel you have received this communication in error or would no longer like to receive these types of communications, please e-mail [email or web address]

## 2018-07-16 ENCOUNTER — HOSPITAL ENCOUNTER (EMERGENCY)
Facility: OTHER | Age: 38
Discharge: HOME | End: 2018-07-16
Attending: OBSTETRICS & GYNECOLOGY
Payer: COMMERCIAL

## 2018-07-16 VITALS — OXYGEN SATURATION: 100 % | DIASTOLIC BLOOD PRESSURE: 61 MMHG | SYSTOLIC BLOOD PRESSURE: 112 MMHG | HEART RATE: 97 BPM

## 2018-07-16 DIAGNOSIS — K59.00 CONSTIPATION DURING PREGNANCY IN SECOND TRIMESTER: ICD-10-CM

## 2018-07-16 DIAGNOSIS — O26.892 ABDOMINAL PAIN IN PREGNANCY, SECOND TRIMESTER: Primary | ICD-10-CM

## 2018-07-16 DIAGNOSIS — O99.612 CONSTIPATION DURING PREGNANCY IN SECOND TRIMESTER: ICD-10-CM

## 2018-07-16 DIAGNOSIS — R10.9 ABDOMINAL PAIN IN PREGNANCY, SECOND TRIMESTER: Primary | ICD-10-CM

## 2018-07-16 DIAGNOSIS — Z3A.22 22 WEEKS GESTATION OF PREGNANCY: ICD-10-CM

## 2018-07-16 PROCEDURE — 99283 EMERGENCY DEPT VISIT LOW MDM: CPT

## 2018-07-16 PROCEDURE — 99283 EMERGENCY DEPT VISIT LOW MDM: CPT | Mod: ,,, | Performed by: OBSTETRICS & GYNECOLOGY

## 2018-07-16 NOTE — ED PROVIDER NOTES
Encounter Date: 2018       History     Chief Complaint   Patient presents with    Abdominal Cramping     38 yo  at 22w1d presents complaining of abdominal cramping. States that a cramping, burning abdominal started last night and has persisted this morning. She reports that it is a constant pain. States that it does not feel like contractions. Denies nausea/vomiting, constipation, diarrhea. She has not been drinking any water lately. She did not try taking anything at home for pain as she does not like taking medicine. Denies vaginal bleeding, leakage of fluid. Reports fetal movement unchanged from baseline.          Review of patient's allergies indicates:  No Known Allergies  Past Medical History:   Diagnosis Date    Glaucoma     Pregnancy      Past Surgical History:   Procedure Laterality Date     SECTION      MYOMECTOMY      MYOMECTOMY  2010     Family History   Problem Relation Age of Onset    Breast cancer Paternal Aunt     Glaucoma Father     Glaucoma Paternal Grandmother     Colon cancer Neg Hx     Ovarian cancer Neg Hx     Amblyopia Neg Hx     Blindness Neg Hx     Cataracts Neg Hx     Macular degeneration Neg Hx     Retinal detachment Neg Hx     Strabismus Neg Hx      Social History   Substance Use Topics    Smoking status: Never Smoker    Smokeless tobacco: Never Used    Alcohol use No      Comment: rarely     Review of Systems   Constitutional: Negative for activity change, appetite change, chills and fatigue.   HENT: Negative.    Respiratory: Negative for chest tightness and shortness of breath.    Cardiovascular: Negative for chest pain and leg swelling.   Gastrointestinal: Positive for abdominal pain and constipation. Negative for abdominal distention, diarrhea, nausea and vomiting.        Pain -constant, entire abdomen  BM daily if takes Miralax   Endocrine: Negative.    Genitourinary: Negative for dysuria, frequency, pelvic pain, urgency, vaginal bleeding and  vaginal discharge.   Musculoskeletal: Negative.    Skin: Negative.    Neurological: Negative for dizziness and headaches.       Physical Exam     Initial Vitals   BP Pulse Resp Temp SpO2   18 0840 18 0845 -- -- 18 0845   112/61 90   100 %      MAP       --              FHR: 155-160 by doppler  Physical Exam    Vitals reviewed.  Constitutional: She appears well-developed and well-nourished. She is not diaphoretic. No distress.   HENT:   Head: Normocephalic and atraumatic.   Nose: Nose normal.   Cardiovascular: Normal rate, regular rhythm, normal heart sounds and intact distal pulses.   Pulmonary/Chest: Breath sounds normal. No respiratory distress. She has no wheezes.   Abdominal: Bowel sounds are normal. She exhibits no distension. There is no tenderness. There is no rebound.   Gravid; size = date   Musculoskeletal: She exhibits no edema or tenderness.   Neurological: She is alert and oriented to person, place, and time.   Skin: Skin is warm and dry. Capillary refill takes less than 2 seconds. No rash noted.   Psychiatric: She has a normal mood and affect. Her behavior is normal. Judgment and thought content normal.         ED Course   Procedures  Labs Reviewed - No data to display       Imaging Results    None          Medical Decision Making:   History:   Old Medical Records: I decided to obtain old medical records.  Old Records Summarized: records from clinic visits.       <> Summary of Records: Prenatal records reviewed  Initial Assessment:   36 yo  at 22w1d presents for evaluation of abdominal cramping.  ED Management:  Vital sign stable.  Fetal heart tones confirmed by bedside doppler.  No contractions on tocometer.  Urine dip - normal, except for dark color  No pain in OB ED - does not want tylenol as she does not like taking medicine.  PO hydration              Attending Attestation:   Physician Attestation Statement for Resident:  As the supervising MD   Physician Attestation  Statement: I have personally seen and examined this patient.   I agree with the above history. -:   As the supervising MD I agree with the above PE.    As the supervising MD I agree with the above treatment, course, plan, and disposition.  I have reviewed the following: old records at this facility.                       Clinical Impression:   The primary encounter diagnosis was Abdominal pain in pregnancy, second trimester. Diagnoses of 22 weeks gestation of pregnancy and Constipation during pregnancy in second trimester were also pertinent to this visit.      Disposition:   Disposition: Discharged  Condition: Stable  Labor precautions given  Counseled pt to continue Miralax for constipation and encouraged pt to drink plenty of fluids      Petrona Mckeon MD   OBGYN, PGY-1                        Petrona Mckeon MD  Resident  07/16/18 1133       Kiki Contreras MD  07/16/18 1300

## 2018-07-30 ENCOUNTER — HOSPITAL ENCOUNTER (EMERGENCY)
Facility: OTHER | Age: 38
Discharge: HOME | End: 2018-07-30
Attending: OBSTETRICS & GYNECOLOGY
Payer: COMMERCIAL

## 2018-07-30 VITALS — DIASTOLIC BLOOD PRESSURE: 62 MMHG | SYSTOLIC BLOOD PRESSURE: 109 MMHG | OXYGEN SATURATION: 100 % | HEART RATE: 93 BPM

## 2018-07-30 DIAGNOSIS — O36.8121 DECREASED FETAL MOVEMENTS IN SECOND TRIMESTER, FETUS 1 OF MULTIPLE GESTATION: ICD-10-CM

## 2018-07-30 DIAGNOSIS — Z98.890 H/O MYOMECTOMY: ICD-10-CM

## 2018-07-30 DIAGNOSIS — Z98.891 H/O CESAREAN SECTION: ICD-10-CM

## 2018-07-30 DIAGNOSIS — Z3A.24 24 WEEKS GESTATION OF PREGNANCY: Primary | ICD-10-CM

## 2018-07-30 PROCEDURE — 99283 EMERGENCY DEPT VISIT LOW MDM: CPT

## 2018-07-30 PROCEDURE — 99283 EMERGENCY DEPT VISIT LOW MDM: CPT | Mod: ,,, | Performed by: OBSTETRICS & GYNECOLOGY

## 2018-07-30 NOTE — ED PROVIDER NOTES
Encounter Date: 2018       History     Chief Complaint   Patient presents with    Decreased Fetal Movement     Maria Eugenia Briscoe is a 37 y.o. [id number] at 24w1d presents complaining of decreased fetal movement. Patient states she has been feeling the baby move less with no movement felt since last night.   This IUP is complicated by AMA and history of  delivery due to previous myomectomy.  Patient denies contractions, denies vaginal bleeding, denies LOF.   Fetal Movement: decreased. No further complaints at this time.           Review of patient's allergies indicates:  No Known Allergies  Past Medical History:   Diagnosis Date    Glaucoma     Pregnancy      Past Surgical History:   Procedure Laterality Date     SECTION      MYOMECTOMY      MYOMECTOMY  2010     Family History   Problem Relation Age of Onset    Breast cancer Paternal Aunt     Glaucoma Father     Glaucoma Paternal Grandmother     Colon cancer Neg Hx     Ovarian cancer Neg Hx     Amblyopia Neg Hx     Blindness Neg Hx     Cataracts Neg Hx     Macular degeneration Neg Hx     Retinal detachment Neg Hx     Strabismus Neg Hx      Social History   Substance Use Topics    Smoking status: Never Smoker    Smokeless tobacco: Never Used    Alcohol use No      Comment: rarely     Review of Systems   Constitutional: Negative for activity change, chills, diaphoresis, fatigue and fever.   HENT: Negative for trouble swallowing.    Eyes: Negative for photophobia and visual disturbance.   Respiratory: Negative for cough, chest tightness, shortness of breath and wheezing.    Cardiovascular: Negative for chest pain and palpitations.   Gastrointestinal: Negative for abdominal pain, diarrhea, nausea and vomiting.   Genitourinary: Negative for difficulty urinating, dysuria, hematuria, pelvic pain, vaginal bleeding, vaginal discharge and vaginal pain.   Musculoskeletal: Negative for arthralgias and myalgias.   Neurological: Negative  for dizziness, syncope, weakness and light-headedness.       Physical Exam     Initial Vitals   BP Pulse Resp Temp SpO2   07/30/18 1323 07/30/18 1323 -- -- 07/30/18 1329   (!) 102/57 92   100 %      MAP       --                Physical Exam    Vitals reviewed.  Constitutional: She appears well-developed and well-nourished. She is not diaphoretic. No distress.   HENT:   Head: Normocephalic and atraumatic.   Nose: Nose normal.   Eyes: Conjunctivae are normal. Right eye exhibits no discharge. Left eye exhibits no discharge. No scleral icterus.   Neck: Normal range of motion.   Cardiovascular: Normal rate, regular rhythm, normal heart sounds and intact distal pulses.   Pulmonary/Chest: Breath sounds normal. No respiratory distress. She has no wheezes. She exhibits no tenderness.   Abdominal: Soft. There is no tenderness. There is no rebound.   Genitourinary:   Genitourinary Comments: deferred   Neurological: She is alert and oriented to person, place, and time.   Skin: Skin is warm and dry. No rash noted. No erythema.   Psychiatric: She has a normal mood and affect. Her behavior is normal. Judgment and thought content normal.         ED Course   Procedures  Labs Reviewed - No data to display       Imaging Results    None          Medical Decision Making:   Differential Diagnosis:   Patient counseled regarding expectations with feeling fetal movement. Instructed on methods to assess for fetal movement. Heart tones confirmed. Vital signs stable.               Attending Attestation:   Physician Attestation Statement for Resident:  As the supervising MD   Physician Attestation Statement: I have personally seen and examined this patient.   I agree with the above history. -:   As the supervising MD I agree with the above PE.    As the supervising MD I agree with the above treatment, course, plan, and disposition.   -: Patient evaluated and found to be stable, agree with resident's assessment of decreased fetal movement now  resolved and plan to discharge after discussion re movement counts, expected intensity of fetal movement.  I was personally present during the critical portions of the procedure(s) performed by the resident and was immediately available in the ED to provide services and assistance as needed during the entire procedure.  I have reviewed the following: old records at this facility.                       Clinical Impression:   The primary encounter diagnosis was 24 weeks gestation of pregnancy. Diagnoses of Decreased fetal movements in second trimester, fetus 1 of multiple gestation, H/O  section, and H/O myomectomy were also pertinent to this visit.                             Rosalee Al MD  Resident  [phone]       Heather Delatorre MD  18 [phone]

## 2018-07-30 NOTE — DISCHARGE INSTRUCTIONS
Call clinic [phone] or L & D after hours at [phone] for vaginal bleeding, leakage of fluids, contractions 4-5 in one hour, decreased fetal movements ( 10 kicks in 2 hours), headache not relieved by Tylenol, blurry vision, or temp of 100.4 or greater.  Begin doing fetal kick counts, at least 10 movements in 2 hours starting at 28 weeks gestation.  Keep next clinic appointment

## 2018-08-06 ENCOUNTER — LAB VISIT (OUTPATIENT)
Dept: LAB | Facility: OTHER | Age: 38
End: 2018-08-06
Attending: OBSTETRICS & GYNECOLOGY
Payer: COMMERCIAL

## 2018-08-06 ENCOUNTER — OFFICE VISIT (OUTPATIENT)
Dept: MATERNAL FETAL MEDICINE | Facility: CLINIC | Age: 38
End: 2018-08-06
Attending: OBSTETRICS & GYNECOLOGY
Payer: COMMERCIAL

## 2018-08-06 ENCOUNTER — ROUTINE PRENATAL (OUTPATIENT)
Dept: OBSTETRICS AND GYNECOLOGY | Facility: CLINIC | Age: 38
End: 2018-08-06
Attending: OBSTETRICS & GYNECOLOGY
Payer: COMMERCIAL

## 2018-08-06 VITALS
SYSTOLIC BLOOD PRESSURE: 110 MMHG | BODY MASS INDEX: 27.81 KG/M2 | DIASTOLIC BLOOD PRESSURE: 60 MMHG | WEIGHT: 162.06 LBS

## 2018-08-06 DIAGNOSIS — Z36.89 ENCOUNTER FOR ULTRASOUND TO CHECK FETAL GROWTH: Primary | ICD-10-CM

## 2018-08-06 DIAGNOSIS — Z34.90 NORMAL REPEAT PREGNANCY, ANTEPARTUM: Primary | ICD-10-CM

## 2018-08-06 DIAGNOSIS — Z98.890 HISTORY OF MYOMECTOMY: ICD-10-CM

## 2018-08-06 DIAGNOSIS — Z34.90 NORMAL REPEAT PREGNANCY, ANTEPARTUM: ICD-10-CM

## 2018-08-06 DIAGNOSIS — Z98.891 HISTORY OF CESAREAN SECTION: ICD-10-CM

## 2018-08-06 DIAGNOSIS — Z36.89 ENCOUNTER FOR ULTRASOUND TO CHECK FETAL GROWTH: ICD-10-CM

## 2018-08-06 DIAGNOSIS — O09.529 ANTEPARTUM MULTIGRAVIDA OF ADVANCED MATERNAL AGE: ICD-10-CM

## 2018-08-06 DIAGNOSIS — O09.522 ELDERLY MULTIGRAVIDA IN SECOND TRIMESTER: ICD-10-CM

## 2018-08-06 DIAGNOSIS — O09.521 ELDERLY MULTIGRAVIDA IN FIRST TRIMESTER: ICD-10-CM

## 2018-08-06 LAB
BASOPHILS # BLD AUTO: 0.02 K/UL
BASOPHILS NFR BLD: 0.2 %
DIFFERENTIAL METHOD: ABNORMAL
EOSINOPHIL # BLD AUTO: 0.1 K/UL
EOSINOPHIL NFR BLD: 1 %
ERYTHROCYTE [DISTWIDTH] IN BLOOD BY AUTOMATED COUNT: 13 %
GLUCOSE SERPL-MCNC: 71 MG/DL
HCT VFR BLD AUTO: 36.5 %
HGB BLD-MCNC: 12.1 G/DL
LYMPHOCYTES # BLD AUTO: 0.8 K/UL
LYMPHOCYTES NFR BLD: 8.5 %
MCH RBC QN AUTO: 33.1 PG
MCHC RBC AUTO-ENTMCNC: 33.2 G/DL
MCV RBC AUTO: 100 FL
MONOCYTES # BLD AUTO: 0.7 K/UL
MONOCYTES NFR BLD: 7.2 %
NEUTROPHILS # BLD AUTO: 8 K/UL
NEUTROPHILS NFR BLD: 82.9 %
PLATELET # BLD AUTO: 193 K/UL
PMV BLD AUTO: 10.5 FL
RBC # BLD AUTO: 3.66 M/UL
WBC # BLD AUTO: 9.67 K/UL

## 2018-08-06 PROCEDURE — 76816 OB US FOLLOW-UP PER FETUS: CPT | Mod: S$GLB,,, | Performed by: OBSTETRICS & GYNECOLOGY

## 2018-08-06 PROCEDURE — 0502F SUBSEQUENT PRENATAL CARE: CPT | Mod: S$GLB,,, | Performed by: OBSTETRICS & GYNECOLOGY

## 2018-08-06 PROCEDURE — 99999 PR PBB SHADOW E&M-EST. PATIENT-LVL II: CPT | Mod: PBBFAC,,, | Performed by: OBSTETRICS & GYNECOLOGY

## 2018-08-06 PROCEDURE — 99499 UNLISTED E&M SERVICE: CPT | Mod: S$GLB,,, | Performed by: OBSTETRICS & GYNECOLOGY

## 2018-08-06 PROCEDURE — 36415 COLL VENOUS BLD VENIPUNCTURE: CPT

## 2018-08-06 PROCEDURE — 85025 COMPLETE CBC W/AUTO DIFF WBC: CPT

## 2018-08-06 PROCEDURE — 82950 GLUCOSE TEST: CPT

## 2018-08-06 NOTE — PROGRESS NOTES
No problem.  No vaginal bleeding, no loss of fluid, positive fetal movement, no contractions.  DM screen, CBC pending.  Tdap with next visit.  Considering tubal ligation - discussed alternatives.    Bleeding/labor/rom/fmc precautions.

## 2018-08-06 NOTE — PROGRESS NOTES
Obstetrical ultrasound completed today.  See report in imaging section of [facility].

## 2018-08-06 NOTE — LETTER
August 6, 2018      Kristen Neumann MD  [address]  [address]  [facility] [address]           [facility]  [address]  [facility] [address]  Phone: [phone]          Patient: Maria Eugenia Briscoe   MR Number: [id number]   YOB: 1980   Date of Visit: 8/6/2018       Dear Dr. Kristen eNumann:    Thank you for referring Maria Eugenia Briscoe to me for evaluation. Attached you will find relevant portions of my assessment and plan of care.    If you have questions, please do not hesitate to call me. I look forward to following Maria Eugenia Briscoe along with you.    Sincerely,    Patsy Bender MD    Enclosure  CC:  No Recipients    If you would like to receive this communication electronically, please contact [email or web address] or [phone] to request more information on [employer] Link access.    For providers and/or their staff who would like to refer a patient to [facility], please contact us through our one-stop-shop provider referral line, [facility], at [phone].    If you feel you have received this communication in error or would no longer like to receive these types of communications, please e-mail [email or web address]

## 2018-08-13 ENCOUNTER — PATIENT MESSAGE (OUTPATIENT)
Dept: OBSTETRICS AND GYNECOLOGY | Facility: CLINIC | Age: 38
End: 2018-08-13

## 2018-08-15 ENCOUNTER — TELEPHONE (OUTPATIENT)
Dept: OBSTETRICS AND GYNECOLOGY | Facility: CLINIC | Age: 38
End: 2018-08-15

## 2018-08-15 ENCOUNTER — ROUTINE PRENATAL (OUTPATIENT)
Dept: OBSTETRICS AND GYNECOLOGY | Facility: CLINIC | Age: 38
End: 2018-08-15
Payer: COMMERCIAL

## 2018-08-15 VITALS
BODY MASS INDEX: 27.93 KG/M2 | WEIGHT: 162.69 LBS | SYSTOLIC BLOOD PRESSURE: 102 MMHG | DIASTOLIC BLOOD PRESSURE: 68 MMHG

## 2018-08-15 DIAGNOSIS — O09.92 SUPERVISION OF HIGH RISK PREGNANCY IN SECOND TRIMESTER: ICD-10-CM

## 2018-08-15 DIAGNOSIS — Z98.891 HISTORY OF CESAREAN SECTION: Primary | ICD-10-CM

## 2018-08-15 PROBLEM — O09.521 ELDERLY MULTIGRAVIDA IN FIRST TRIMESTER: Status: RESOLVED | Noted: 2018-05-14 | Resolved: 2018-08-15

## 2018-08-15 PROBLEM — O09.90 PREGNANCY, SUPERVISION, HIGH-RISK: Status: ACTIVE | Noted: 2018-08-15

## 2018-08-15 PROCEDURE — 3008F BODY MASS INDEX DOCD: CPT | Mod: CPTII,S$GLB,, | Performed by: OBSTETRICS & GYNECOLOGY

## 2018-08-15 PROCEDURE — 99999 PR PBB SHADOW E&M-EST. PATIENT-LVL II: CPT | Mod: PBBFAC,,, | Performed by: OBSTETRICS & GYNECOLOGY

## 2018-08-15 PROCEDURE — 0502F SUBSEQUENT PRENATAL CARE: CPT | Mod: S$GLB,,, | Performed by: OBSTETRICS & GYNECOLOGY

## 2018-08-15 NOTE — PROGRESS NOTES
"Good fm.  Denies ctx, vb, lof.  She complains of constipation.  For the constipation, push fluids and maintain a high fiber diet with plenty of roughage. Drink 6-8 large glasses of water daily to avoid constipation in the future. Fruits that start with "p" are high in fiber and good for constipation (i.e. Peaches, plums, pears, pineapple).  Avoid bananas, rice, and bread.  Baked sweet and white potatoes are high in fiber as well as dried fruits and cereals.  Milk of magnesia and miralax may be used a necessary.  Call if symptoms persist or worsen.    "

## 2018-08-15 NOTE — TELEPHONE ENCOUNTER
----- Message from Mali Rao sent at 8/15/2018  9:20 AM CDT -----  Contact: self  OB pt 26 weeks, pt states she has been constipated since Saturday, she can be reached at [phone].

## 2018-08-15 NOTE — TELEPHONE ENCOUNTER
Returned the pt's call to the clinic. Pt stated that she has not had a bowel movement in 5 days and was instructed to contact the clinic if she did not. Pt stated that she tried all of the recommendations that were given to her through the portal. Offered the pt an appointment at the [facility] location since Dr. Neumann isn't in clinic today. Pt agreed to an appointment with Dr. Garza for 1:15PM today. Pt informed of the appointment location, date, and time. Pt verbalized understanding.

## 2018-09-05 ENCOUNTER — ROUTINE PRENATAL (OUTPATIENT)
Dept: OBSTETRICS AND GYNECOLOGY | Facility: CLINIC | Age: 38
End: 2018-09-05
Attending: OBSTETRICS & GYNECOLOGY
Payer: COMMERCIAL

## 2018-09-05 ENCOUNTER — TELEPHONE (OUTPATIENT)
Dept: OBSTETRICS AND GYNECOLOGY | Facility: CLINIC | Age: 38
End: 2018-09-05

## 2018-09-05 VITALS
DIASTOLIC BLOOD PRESSURE: 58 MMHG | WEIGHT: 168.44 LBS | SYSTOLIC BLOOD PRESSURE: 108 MMHG | BODY MASS INDEX: 28.91 KG/M2

## 2018-09-05 DIAGNOSIS — Z98.890 HISTORY OF MYOMECTOMY: Primary | ICD-10-CM

## 2018-09-05 DIAGNOSIS — Z98.891 HISTORY OF CESAREAN SECTION: ICD-10-CM

## 2018-09-05 PROBLEM — O09.90 PREGNANCY, SUPERVISION, HIGH-RISK: Status: RESOLVED | Noted: 2018-08-15 | Resolved: 2018-09-05

## 2018-09-05 PROBLEM — O09.523 ELDERLY MULTIGRAVIDA IN THIRD TRIMESTER: Status: ACTIVE | Noted: 2018-05-14

## 2018-09-05 PROCEDURE — 90715 TDAP VACCINE 7 YRS/> IM: CPT | Mod: S$GLB,,, | Performed by: OBSTETRICS & GYNECOLOGY

## 2018-09-05 PROCEDURE — 99999 PR PBB SHADOW E&M-EST. PATIENT-LVL II: CPT | Mod: PBBFAC,,, | Performed by: OBSTETRICS & GYNECOLOGY

## 2018-09-05 PROCEDURE — 0502F SUBSEQUENT PRENATAL CARE: CPT | Mod: S$GLB,,, | Performed by: OBSTETRICS & GYNECOLOGY

## 2018-09-05 PROCEDURE — 99999 PR PBB SHADOW E&M-EST. PATIENT-LVL I: CPT | Mod: PBBFAC,,,

## 2018-09-05 PROCEDURE — 90471 IMMUNIZATION ADMIN: CPT | Mod: S$GLB,,, | Performed by: OBSTETRICS & GYNECOLOGY

## 2018-09-05 NOTE — PROGRESS NOTES
Here for TDAP injection. Patient without complaint of pain at this time ,Injection given. Tolerated well. No pain noted after injection.  Advised to wait in lobby 15 minutes and report any adverse reactions.         Site: [facility]    Baby Friendly Handout Given to Patient

## 2018-09-05 NOTE — PROGRESS NOTES
No problem.  No vaginal bleeding, no loss of fluid, positive fetal movement, no contractions.  Tdap today.  MFM sono on 9/24 at 32 weeks.  Repeat C/S on 10/29.  Nexplanon for contraception - authorization signed today.  Bleeding/labor/rom/fmc precautions.

## 2018-09-05 NOTE — TELEPHONE ENCOUNTER
Contacted PNT spoke with Felisha. Felisha scheduled the pt's repeat c/s on 10/29 at 12:30PM. Felisha inquiring why the patient is having a c/s at 37 weeks. Informed Felisha that Dr. Neumann will be informed of the inquiry and that she will be contacted after speaking with Dr. Neumann. Felisha verbalized understanding.

## 2018-09-09 ENCOUNTER — HOSPITAL ENCOUNTER (EMERGENCY)
Facility: OTHER | Age: 38
Discharge: HOME | End: 2018-09-09
Attending: OBSTETRICS & GYNECOLOGY
Payer: COMMERCIAL

## 2018-09-09 VITALS — DIASTOLIC BLOOD PRESSURE: 58 MMHG | SYSTOLIC BLOOD PRESSURE: 113 MMHG | TEMPERATURE: 98 F | HEART RATE: 92 BPM

## 2018-09-09 DIAGNOSIS — Z3A.30 30 WEEKS GESTATION OF PREGNANCY: ICD-10-CM

## 2018-09-09 DIAGNOSIS — O36.8130 DECREASED FETAL MOVEMENT AFFECTING MANAGEMENT OF PREGNANCY IN THIRD TRIMESTER, SINGLE OR UNSPECIFIED FETUS: Primary | ICD-10-CM

## 2018-09-09 DIAGNOSIS — Z36.89 NST (NON-STRESS TEST) REACTIVE ON FETAL SURVEILLANCE: ICD-10-CM

## 2018-09-09 PROCEDURE — 99283 EMERGENCY DEPT VISIT LOW MDM: CPT | Mod: 25,,, | Performed by: OBSTETRICS & GYNECOLOGY

## 2018-09-09 PROCEDURE — 59025 FETAL NON-STRESS TEST: CPT

## 2018-09-09 PROCEDURE — 99284 EMERGENCY DEPT VISIT MOD MDM: CPT | Mod: 25

## 2018-09-09 PROCEDURE — 59025 FETAL NON-STRESS TEST: CPT | Mod: 26,,, | Performed by: OBSTETRICS & GYNECOLOGY

## 2018-09-09 NOTE — DISCHARGE INSTRUCTIONS
Kick Counts    Its normal to worry about your babys health. One way you can know your babys doing well is to record the babys movements once a day. This is called a kick count. Remember to take your kick count records to all your appointments with your healthcare provider.  How to count kicks  Here are tips for counting kicks:  · Choose a time when the baby is active, such as after a meal.   · Sit comfortably or lie on your side.   · The first time the baby moves, write down the time.   · Count each movement until the baby has moved 10 times. This can take from 20 minutes to 2 hours.   · Try to do it at the same time each day.  When to call your healthcare provider  Call your healthcare provider right away if you notice any of the following:  · Your baby moves fewer than 10 times in 2 hours while youre doing kick counts.  · Your baby moves much less often than on the days before.  · You have not felt your baby move all day.

## 2018-09-09 NOTE — ED PROVIDER NOTES
Encounter Date: 2018       History     Chief Complaint   Patient presents with    Decreased Fetal Movement     Maria Eugenia is a 36yo  at 30.0wga here for decreased FM.  She felt some flutters this morning, and no movement this afternoon.  She tried cold water, orange juice, and lying down for kick counts.   She has prenatal care with Dr. Neumann, which is compliicated by previous c/s, previous myomectomy, and anterior placenta          Review of patient's allergies indicates:  No Known Allergies  Past Medical History:   Diagnosis Date    Glaucoma     Pregnancy      Past Surgical History:   Procedure Laterality Date     SECTION      DELIVERY-CEASAREAN SECTION N/A 8/10/2015    Performed by Yemi Arredondo III, MD at [facility] L&D    MYOMECTOMY      MYOMECTOMY  2010     Family History   Problem Relation Age of Onset    Breast cancer Paternal Aunt     Glaucoma Father     Glaucoma Paternal Grandmother     Colon cancer Neg Hx     Ovarian cancer Neg Hx     Amblyopia Neg Hx     Blindness Neg Hx     Cataracts Neg Hx     Macular degeneration Neg Hx     Retinal detachment Neg Hx     Strabismus Neg Hx      Social History     Tobacco Use    Smoking status: Never Smoker    Smokeless tobacco: Never Used   Substance Use Topics    Alcohol use: No     Comment: rarely    Drug use: No     Review of Systems   Constitutional: Negative for activity change, appetite change, chills and fever.   Eyes: Negative for visual disturbance.   Respiratory: Negative for cough and shortness of breath.    Cardiovascular: Negative for leg swelling.   Gastrointestinal: Negative for abdominal pain, constipation, diarrhea, nausea and vomiting.   Genitourinary: Negative for vaginal bleeding, vaginal discharge and vaginal pain.   Neurological: Negative for seizures and headaches.       Physical Exam     Initial Vitals [18 1722]   BP Pulse Resp Temp SpO2   (!) 113/58 92 -- 97.8 °F (36.6 °C) --      MAP       --          Physical Exam    Constitutional: She appears well-developed and well-nourished. She is not diaphoretic. No distress.   Eyes: Conjunctivae are normal. Right eye exhibits no discharge. Left eye exhibits no discharge.   Neck: Neck supple.   Cardiovascular: Normal rate.   Pulmonary/Chest: No respiratory distress.   Abdominal: Soft. There is no tenderness. There is no rebound and no guarding.   Gravid, fundus NT  Fetal movement heard repetitively on monitor  Also, I was able to palpate several fetal movements that mom couldn't feel  She did feel one movement that distorted abdominal wall   Musculoskeletal: She exhibits no edema.   Neurological: She is alert and oriented to person, place, and time.   Skin: Skin is warm and dry. No rash noted. No erythema.   Psychiatric: She has a normal mood and affect. Her behavior is normal. Thought content normal.         ED Course   Obtain Fetal nonstress test (NST)  Date/Time: 9/9/2018 6:00 PM  Performed by: Felisha Babcock DO  Authorized by: Felisha Babcock DO     Nonstress Test:     Variability:  6-25 BPM    Decelerations:  None    Accelerations:  15 bpm    Acoustic Stimulator: No      Baseline:  150    Uterine Irritability: No      Contractions:  Not present  Biophysical Profile:     Nonstress Test Interpretation: reactive      Overall Impression:  Reassuring      Labs Reviewed - No data to display       Imaging Results    None          Medical Decision Making:   ED Management:  Reassurance provided that she did the right thing by coming in.  We can only tell fetal status by NST  However, she may have decreased feeling of fetal movements due to growing anterior placenta, and growing baby  We discussed trying a small amount of caffeine, but the bottom line is that she needs to come in baby movement does not meet kick counts  Maria Eugenia verbalized understanding, and is discharged in stable condition                      Clinical Impression:     1. Decreased fetal movement  affecting management of pregnancy in third trimester, single or unspecified fetus    2. 30 weeks gestation of pregnancy    3. NST (non-stress test) reactive on fetal surveillance          Disposition:   Disposition: Discharged  Condition: Stable         Felisha DHAVAL Babcock DO                 Felishaher DHAVAL Babcock DO  09/09/18 0766

## 2018-09-11 ENCOUNTER — PATIENT MESSAGE (OUTPATIENT)
Dept: ADMINISTRATIVE | Facility: OTHER | Age: 38
End: 2018-09-11

## 2018-09-17 ENCOUNTER — ROUTINE PRENATAL (OUTPATIENT)
Dept: OBSTETRICS AND GYNECOLOGY | Facility: CLINIC | Age: 38
End: 2018-09-17
Attending: OBSTETRICS & GYNECOLOGY
Payer: COMMERCIAL

## 2018-09-17 VITALS
DIASTOLIC BLOOD PRESSURE: 60 MMHG | WEIGHT: 172.63 LBS | BODY MASS INDEX: 29.63 KG/M2 | SYSTOLIC BLOOD PRESSURE: 120 MMHG

## 2018-09-17 DIAGNOSIS — O09.523 ELDERLY MULTIGRAVIDA IN THIRD TRIMESTER: ICD-10-CM

## 2018-09-17 DIAGNOSIS — Z98.890 HISTORY OF MYOMECTOMY: ICD-10-CM

## 2018-09-17 DIAGNOSIS — Z98.891 HISTORY OF CESAREAN SECTION: ICD-10-CM

## 2018-09-17 DIAGNOSIS — Z34.90 NORMAL REPEAT PREGNANCY, ANTEPARTUM: Primary | ICD-10-CM

## 2018-09-17 PROCEDURE — 0502F SUBSEQUENT PRENATAL CARE: CPT | Mod: S$GLB,,, | Performed by: OBSTETRICS & GYNECOLOGY

## 2018-09-17 PROCEDURE — 99999 PR PBB SHADOW E&M-EST. PATIENT-LVL II: CPT | Mod: PBBFAC,,, | Performed by: OBSTETRICS & GYNECOLOGY

## 2018-09-17 NOTE — PROGRESS NOTES
No problem.  No vaginal bleeding, no loss of fluid, positive fetal movement, no contractions.  Discussed timing of .  Again recommended 37 weeks as she is s/p myomectomy and  x 1.  Patient states understanding.  Bleeding/labor/rom/fmc precautions.

## 2018-09-19 ENCOUNTER — TELEPHONE (OUTPATIENT)
Dept: OBSTETRICS AND GYNECOLOGY | Facility: CLINIC | Age: 38
End: 2018-09-19

## 2018-09-21 ENCOUNTER — HOSPITAL ENCOUNTER (EMERGENCY)
Facility: OTHER | Age: 38
Discharge: HOME | End: 2018-09-21
Attending: OBSTETRICS & GYNECOLOGY
Payer: COMMERCIAL

## 2018-09-21 VITALS
TEMPERATURE: 98 F | RESPIRATION RATE: 17 BRPM | SYSTOLIC BLOOD PRESSURE: 108 MMHG | DIASTOLIC BLOOD PRESSURE: 61 MMHG | OXYGEN SATURATION: 99 % | HEART RATE: 81 BPM

## 2018-09-21 DIAGNOSIS — Z3A.31 31 WEEKS GESTATION OF PREGNANCY: Primary | ICD-10-CM

## 2018-09-21 DIAGNOSIS — O36.8130 DECREASED FETAL MOVEMENTS IN THIRD TRIMESTER, SINGLE OR UNSPECIFIED FETUS: ICD-10-CM

## 2018-09-21 LAB
ABDOMINAL CIRCUMFERENCE: NORMAL CM
BIPARIETAL DIAMETER: NORMAL CM
ESTIMATED FETAL WEIGHT: NORMAL GRAMS
FEMUR LENGTH: NORMAL
HC/AC: NORMAL
HEAD CIRCUMFERENCE: NORMAL CM

## 2018-09-21 PROCEDURE — 99283 EMERGENCY DEPT VISIT LOW MDM: CPT | Mod: 25,,, | Performed by: OBSTETRICS & GYNECOLOGY

## 2018-09-21 PROCEDURE — 99283 EMERGENCY DEPT VISIT LOW MDM: CPT | Mod: 25

## 2018-09-21 PROCEDURE — 59025 FETAL NON-STRESS TEST: CPT | Mod: 26,,, | Performed by: OBSTETRICS & GYNECOLOGY

## 2018-09-21 PROCEDURE — 59025 FETAL NON-STRESS TEST: CPT

## 2018-09-21 NOTE — ED PROVIDER NOTES
Encounter Date: 2018       History     Chief Complaint   Patient presents with    Decreased Fetal Movement     This is a 37 year old  at 31 5/7 weeks who presents to the [facility] with decreased fetal movement.  She has not felt the baby move today since she woke up this morning but has noted small movements since being in the [facility].  She denies contractions, vaginal bleeding, or leakage of fluid.  This pregnancy is complicated by AMA, previous  delivery, and myomectomy.           Review of patient's allergies indicates:  No Known Allergies  Past Medical History:   Diagnosis Date    Glaucoma     Pregnancy      Past Surgical History:   Procedure Laterality Date     SECTION      DELIVERY-CEASAREAN SECTION N/A 8/10/2015    Performed by [facility] at [facility] L[facility]D    MYOMECTOMY      MYOMECTOMY  2010     Family History   Problem Relation Age of Onset    Breast cancer Paternal Aunt     Glaucoma Father     Glaucoma Paternal Grandmother     Colon cancer Neg Hx     Ovarian cancer Neg Hx     Amblyopia Neg Hx     Blindness Neg Hx     Cataracts Neg Hx     Macular degeneration Neg Hx     Retinal detachment Neg Hx     Strabismus Neg Hx      Social History     Tobacco Use    Smoking status: Never Smoker    Smokeless tobacco: Never Used   Substance Use Topics    Alcohol use: No     Comment: rarely    Drug use: No     Review of Systems   Constitutional: Negative for chills and fever.   HENT: Negative for facial swelling and nosebleeds.    Eyes: Negative for photophobia and visual disturbance.   Respiratory: Negative for cough and shortness of breath.    Cardiovascular: Negative for chest pain, palpitations and leg swelling.   Gastrointestinal: Negative for abdominal pain, nausea and vomiting.   Genitourinary: Negative for dysuria, flank pain, pelvic pain, vaginal bleeding and vaginal discharge.   Musculoskeletal: Negative for back pain.   Skin: Negative for rash.   Neurological:  Negative for dizziness and headaches.       Physical Exam     Initial Vitals [09/21/18 1410]   BP Pulse Resp Temp SpO2   (!) 111/58 83 -- 97.8 °F (36.6 °C) 99 %      MAP       --       BP (!) 111/58   Pulse 83   Temp 97.8 °F (36.6 °C)   LMP 02/17/2018   SpO2 99%     Physical Exam    Vitals reviewed.  Constitutional: She appears well-developed and well-nourished. No distress.   Cardiovascular: Normal rate.   Pulmonary/Chest: No respiratory distress.   Abdominal: Soft. She exhibits no distension. There is no tenderness. There is no rebound and no guarding.   Musculoskeletal: She exhibits no edema or tenderness.   Neurological: She is alert and oriented to person, place, and time.   Skin: Skin is warm and dry.   Psychiatric: She has a normal mood and affect.         ED Course   Obtain Fetal nonstress test (NST)  Date/Time: 9/21/2018 2:32 PM  Performed by: Leyla Tapia MD  Authorized by: Leyla Tapia MD     Nonstress Test:     Variability:  6-25 BPM    Decelerations:  None    Accelerations:  15 bpm    Baseline:  140    Contractions:  Not present  Biophysical Profile:     Nonstress Test Interpretation: reactive      Overall Impression:  Reassuring      Labs Reviewed - No data to display       Imaging Results    None          Medical Decision Making:   ED Management:  Vitals stable, no fever  NST reactive and reassuring, no contractions  Patient noted fetal movement while in the [facility].    Bedside u/s with active fetus in breech presentation, MVP 4 cm  Will discharge home with fetal kick count precautions.   Follow up with Dr. Neumann as scheduled.                       Clinical Impression:   The primary encounter diagnosis was 31 weeks gestation of pregnancy. A diagnosis of Decreased fetal movements in third trimester, single or unspecified fetus was also pertinent to this visit.      Disposition:   Disposition: Discharged  Condition: Stable                        Leyla Tapia MD  09/21/18  1430

## 2018-09-21 NOTE — PROGRESS NOTES
Patient discharged home self care. Discharge instructions given to pt at bedside. Pt verbalized understanding of instructions. Vital signs stable. Patient remained afebrile. NST reactive. Patient reports positive fetal movement. No apparent distress noted.

## 2018-09-21 NOTE — DISCHARGE INSTRUCTIONS
Call clinic [phone] or L & D after hours at [phone] for vaginal bleeding, leakage of fluids, regular contractions every 5 mins for 2 hours, decreased fetal movements ( 10 kicks in 2 hours), headache not relieved by Tylenol, blurry vision, or temp of 100.4 or greater.  Begin doing fetal kick counts, at least 10 movements in 2 hours starting at 28 weeks gestation.  Keep next clinic appointment

## 2018-09-24 ENCOUNTER — PROCEDURE VISIT (OUTPATIENT)
Dept: MATERNAL FETAL MEDICINE | Facility: CLINIC | Age: 38
End: 2018-09-24
Attending: OBSTETRICS & GYNECOLOGY
Payer: COMMERCIAL

## 2018-09-24 DIAGNOSIS — Z36.89 ENCOUNTER FOR ULTRASOUND TO CHECK FETAL GROWTH: ICD-10-CM

## 2018-09-24 DIAGNOSIS — O09.529 ANTEPARTUM MULTIGRAVIDA OF ADVANCED MATERNAL AGE: ICD-10-CM

## 2018-09-24 PROCEDURE — 76816 OB US FOLLOW-UP PER FETUS: CPT | Mod: S$GLB,,, | Performed by: OBSTETRICS & GYNECOLOGY

## 2018-09-24 PROCEDURE — 99499 UNLISTED E&M SERVICE: CPT | Mod: S$GLB,,, | Performed by: OBSTETRICS & GYNECOLOGY

## 2018-09-26 ENCOUNTER — PATIENT MESSAGE (OUTPATIENT)
Dept: OBSTETRICS AND GYNECOLOGY | Facility: CLINIC | Age: 38
End: 2018-09-26

## 2018-09-27 ENCOUNTER — TELEPHONE (OUTPATIENT)
Dept: OBSTETRICS AND GYNECOLOGY | Facility: CLINIC | Age: 38
End: 2018-09-27

## 2018-09-27 NOTE — TELEPHONE ENCOUNTER
----- Message from Amanda Lux sent at 9/27/2018 12:54 PM CDT -----  Contact: self  Patient would like to discuss her due date. Patient can be reached at [phone]

## 2018-09-27 NOTE — TELEPHONE ENCOUNTER
Returned the pt's call to the clinic. Pt stated that there is a discrepancy with her due date and what she is measuring. Pt informed that her message was received through the portal and that it was forwarded to Dr. Neumann for further explanation. Pt informed that Dr. Neumann is currently in clinic and that she won't be able to speak with her personally right now, but that she will respond to her message through the portal. Patient verbalized understanding.

## 2018-10-01 ENCOUNTER — ROUTINE PRENATAL (OUTPATIENT)
Dept: OBSTETRICS AND GYNECOLOGY | Facility: CLINIC | Age: 38
End: 2018-10-01
Payer: COMMERCIAL

## 2018-10-01 ENCOUNTER — TELEPHONE (OUTPATIENT)
Dept: OBSTETRICS AND GYNECOLOGY | Facility: CLINIC | Age: 38
End: 2018-10-01

## 2018-10-01 VITALS
SYSTOLIC BLOOD PRESSURE: 108 MMHG | WEIGHT: 173.94 LBS | BODY MASS INDEX: 29.86 KG/M2 | DIASTOLIC BLOOD PRESSURE: 62 MMHG

## 2018-10-01 DIAGNOSIS — Z3A.32 32 WEEKS GESTATION OF PREGNANCY: Primary | ICD-10-CM

## 2018-10-01 PROCEDURE — 0502F SUBSEQUENT PRENATAL CARE: CPT | Mod: S$GLB,,, | Performed by: NURSE PRACTITIONER

## 2018-10-01 PROCEDURE — 99999 PR PBB SHADOW E&M-EST. PATIENT-LVL III: CPT | Mod: PBBFAC,,, | Performed by: NURSE PRACTITIONER

## 2018-10-01 NOTE — PROGRESS NOTES
Here for routine OB appt at 32w2d, with no complaints.  Reports good FM.  Denies LOF, denies VB, denies contractions. Doing well. It's a BOY. Plans to breast feed. Does want a circumcision. Discussed with Dr. Thien BRICEÑO change to 11/18/18.  Reviewed warning signs of Labor and Preeclampsia.  Daily FM counts reinforced.  F/U scheduled 2 weeks

## 2018-10-01 NOTE — TELEPHONE ENCOUNTER
Spoke with Dora in PNT. Pt repeat c/s is moved from 10/29 to 11/08 at 11AM per Dr. Neumann. Dora verbalized understanding.

## 2018-10-08 ENCOUNTER — HOSPITAL ENCOUNTER (EMERGENCY)
Facility: OTHER | Age: 38
Discharge: HOME | End: 2018-10-08
Attending: OBSTETRICS & GYNECOLOGY
Payer: COMMERCIAL

## 2018-10-08 VITALS
DIASTOLIC BLOOD PRESSURE: 64 MMHG | RESPIRATION RATE: 16 BRPM | HEART RATE: 92 BPM | OXYGEN SATURATION: 99 % | SYSTOLIC BLOOD PRESSURE: 116 MMHG | TEMPERATURE: 98 F

## 2018-10-08 DIAGNOSIS — Z3A.33 33 WEEKS GESTATION OF PREGNANCY: ICD-10-CM
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
## 2018-10-08 PROCEDURE — 99284 EMERGENCY DEPT VISIT MOD MDM: CPT | Mod: 25

## 2018-10-08 PROCEDURE — 59025 FETAL NON-STRESS TEST: CPT | Mod: 26,,, | Performed by: OBSTETRICS & GYNECOLOGY

## 2018-10-08 PROCEDURE — 59025 FETAL NON-STRESS TEST: CPT

## 2018-10-08 PROCEDURE — 99283 EMERGENCY DEPT VISIT LOW MDM: CPT | Mod: 25,,, | Performed by: OBSTETRICS & GYNECOLOGY

## 2018-10-08 NOTE — DISCHARGE INSTRUCTIONS
Contact your primary OBGYN or after hours at [phone] if you experience any of the following: contractions every 3-5 minutes for 2 or more hours, leakage of fluid, heavy vaginal discharge, or you are not feeling your baby move.  Continue to do kick counts, 10 kicks within a 2 hour period.  Maintain proper hydration.  Drink 8-10 bottles of water a day.    Contact us if you have a persistent headache that is unrelieved by Tylenol, blurry vision, pain in your abdomen (other than a contraction); persistent nausea and vomiting; or a fever 100.4 or greater    Maintain all follow-up appointments.

## 2018-10-08 NOTE — ED PROVIDER NOTES
Encounter Date: 10/8/2018       History     Chief Complaint   Patient presents with    Abdominal Injury     pt fell on stomach from kneeling position      Maria Eugenia Briscoe is a 37 y.o. [id number] at 33w2d who presents complaining of abdominal trauma. She states that around 1430 this afternoon, she felt forward while sitting on an office chair reaching to open a cabinet below her. She reports falling directly onto the fundal area of her abdomen and denies pain and cramping.      This IUP is complicated by history of  and myomectomy.  Patient denies contractions, denies vaginal bleeding, denies LOF.   Fetal Movement: normal.            Review of patient's allergies indicates:  No Known Allergies  Past Medical History:   Diagnosis Date    Glaucoma     Pregnancy      Past Surgical History:   Procedure Laterality Date     SECTION      DELIVERY-CEASAREAN SECTION N/A 8/10/2015    Performed by Yemi Arredondo [facility] at [facility] L[facility]D    MYOMECTOMY      MYOMECTOMY  2010     Family History   Problem Relation Age of Onset    Breast cancer Paternal Aunt     Glaucoma Father     Glaucoma Paternal Grandmother     Colon cancer Neg Hx     Ovarian cancer Neg Hx     Amblyopia Neg Hx     Blindness Neg Hx     Cataracts Neg Hx     Macular degeneration Neg Hx     Retinal detachment Neg Hx     Strabismus Neg Hx      Social History     Tobacco Use    Smoking status: Never Smoker    Smokeless tobacco: Never Used   Substance Use Topics    Alcohol use: No     Comment: rarely    Drug use: No     Review of Systems   Constitutional: Negative for activity change, appetite change, chills, fatigue and fever.   HENT: Negative for facial swelling and nosebleeds.    Eyes: Negative for visual disturbance.   Respiratory: Negative for shortness of breath.    Cardiovascular: Negative for chest pain and palpitations.   Gastrointestinal: Negative for abdominal pain, diarrhea, nausea and vomiting.   Genitourinary: Negative  for dysuria, pelvic pain, vaginal bleeding and vaginal discharge.   Musculoskeletal: Negative for back pain.   Neurological: Positive for headaches (mild frontal headache). Negative for dizziness and light-headedness.   Psychiatric/Behavioral: Negative for agitation.       Physical Exam     Initial Vitals [10/08/18 1540]   BP Pulse Resp Temp SpO2   116/64 92 16 98.4 °F (36.9 °C) 99 %      MAP       --         Physical Exam    Vitals reviewed.  Constitutional: She appears well-developed and well-nourished. She is not diaphoretic. No distress.   HENT:   Head: Normocephalic and atraumatic.   Cardiovascular: Normal rate, regular rhythm and normal heart sounds. Exam reveals no gallop and no friction rub.    No murmur heard.  Pulmonary/Chest: Breath sounds normal. No respiratory distress. She has no wheezes. She has no rhonchi. She has no rales.   Abdominal: Soft. There is no tenderness. There is no rebound and no guarding.   Gravid. Fundus NT.   Musculoskeletal: She exhibits no edema or tenderness.   Neurological: She is alert and oriented to person, place, and time.   Skin: Skin is warm and dry.   Psychiatric: She has a normal mood and affect. Her behavior is normal. Judgment and thought content normal.         ED Course   Obtain Fetal nonstress test (NST)  Date/Time: 10/9/2018 6:38 AM  Performed by: Catrina Hudson MD  Authorized by: Leyla Tapia MD     Nonstress Test:     Variability:  6-25 BPM    Decelerations:  None    Accelerations:  15 bpm    Baseline:  135    Uterine Irritability: Yes      Contractions:  Irregular  Biophysical Profile:     Nonstress Test Interpretation: reactive      Overall Impression:  Reassuring        Labs Reviewed - No data to display       Imaging Results    None          Medical Decision Making:   ED Management:  - VSS  - Will monitor until 1830  - NST baseline 135, reactive and reassuring  - Uterine irritability on toco  - Discharge home in stable condition, labor  precautions reviewed              Attending Attestation:   Physician Attestation Statement for Resident:  As the supervising MD   Physician Attestation Statement: I have personally seen and examined this patient.   I agree with the above history. -:   As the supervising MD I agree with the above PE.    As the supervising MD I agree with the above treatment, course, plan, and disposition.   -: Patient evaluated and found to be stable, agree with resident's assessment and plan.  I was personally present during the critical portions of the procedure(s) performed by the resident and was immediately available in the ED to provide services and assistance as needed during the entire procedure.  I have reviewed the following: old records at this facility.                    ED Course as of Oct 09 0637   Mon Oct 08, 2018   1554 33 weeks, patient fell onto abdomen from a kneeling position at 1430. Rh positive, previous myomectomy for LTCS.  [AR]   1731 NST reactive and reassuring over last 2 hours, uterine irritability noted.  Patient denies pain.   [AR]   1819 NST continues to be reassuring, 140s reacitve, no decels, rare contractions. Plan to discharge home with precautions.   [AR]      ED Course User Index  [AR] Leyla Tapia MD     Clinical Impression:   The primary encounter diagnosis was Fall, initial encounter. A diagnosis of 33 weeks gestation of pregnancy was also pertinent to this visit.      Disposition:   Disposition: Discharged  Condition: Stable                        Catrina Hudson MD  Resident  10/09/18 0639       Leyla Tapia MD  10/10/18 0956

## 2018-10-18 ENCOUNTER — ROUTINE PRENATAL (OUTPATIENT)
Dept: OBSTETRICS AND GYNECOLOGY | Facility: CLINIC | Age: 38
End: 2018-10-18
Attending: OBSTETRICS & GYNECOLOGY
Payer: COMMERCIAL

## 2018-10-18 VITALS
DIASTOLIC BLOOD PRESSURE: 68 MMHG | SYSTOLIC BLOOD PRESSURE: 108 MMHG | WEIGHT: 176.81 LBS | BODY MASS INDEX: 30.35 KG/M2

## 2018-10-18 DIAGNOSIS — Z98.890 HISTORY OF MYOMECTOMY: ICD-10-CM

## 2018-10-18 DIAGNOSIS — Z98.891 HISTORY OF CESAREAN SECTION: ICD-10-CM

## 2018-10-18 DIAGNOSIS — Z34.90 NORMAL REPEAT PREGNANCY, ANTEPARTUM: Primary | ICD-10-CM

## 2018-10-18 DIAGNOSIS — O09.523 ELDERLY MULTIGRAVIDA IN THIRD TRIMESTER: ICD-10-CM

## 2018-10-18 PROCEDURE — 99999 PR PBB SHADOW E&M-EST. PATIENT-LVL II: CPT | Mod: PBBFAC,,, | Performed by: OBSTETRICS & GYNECOLOGY

## 2018-10-18 PROCEDURE — 0502F SUBSEQUENT PRENATAL CARE: CPT | Mod: S$GLB,,, | Performed by: OBSTETRICS & GYNECOLOGY

## 2018-10-19 ENCOUNTER — IMMUNIZATION (OUTPATIENT)
Dept: PHARMACY | Facility: CLINIC | Age: 38
End: 2018-10-19
Payer: COMMERCIAL

## 2018-10-29 ENCOUNTER — RESEARCH ENCOUNTER (OUTPATIENT)
Dept: RESEARCH | Facility: HOSPITAL | Age: 38
End: 2018-10-29

## 2018-10-29 ENCOUNTER — ANESTHESIA (OUTPATIENT)
Dept: OBSTETRICS AND GYNECOLOGY | Facility: OTHER | Age: 38
End: 2018-10-29
Payer: COMMERCIAL

## 2018-10-29 ENCOUNTER — HOSPITAL ENCOUNTER (INPATIENT)
Facility: OTHER | Age: 38
LOS: 4 days | Discharge: HOME | End: 2018-11-02
Attending: OBSTETRICS & GYNECOLOGY | Admitting: OBSTETRICS & GYNECOLOGY
Payer: COMMERCIAL

## 2018-10-29 ENCOUNTER — ANESTHESIA EVENT (OUTPATIENT)
Dept: OBSTETRICS AND GYNECOLOGY | Facility: OTHER | Age: 38
End: 2018-10-29
Payer: COMMERCIAL

## 2018-10-29 DIAGNOSIS — O42.90 LEAKAGE OF AMNIOTIC FLUID: Primary | ICD-10-CM

## 2018-10-29 DIAGNOSIS — Z98.891 HISTORY OF CESAREAN SECTION: ICD-10-CM

## 2018-10-29 DIAGNOSIS — Z3A.36 36 WEEKS GESTATION OF PREGNANCY: ICD-10-CM

## 2018-10-29 DIAGNOSIS — Z98.891 S/P C-SECTION: ICD-10-CM

## 2018-10-29 DIAGNOSIS — Z87.59 HISTORY OF PRETERM PREMATURE RUPTURE OF MEMBRANES (PPROM): ICD-10-CM

## 2018-10-29 DIAGNOSIS — Z98.890 HISTORY OF MYOMECTOMY: ICD-10-CM

## 2018-10-29 PROBLEM — O09.523 ELDERLY MULTIGRAVIDA IN THIRD TRIMESTER: Status: RESOLVED | Noted: 2018-05-14 | Resolved: 2018-10-29

## 2018-10-29 LAB
ABO + RH BLD: NORMAL
BASOPHILS # BLD AUTO: 0.02 K/UL
BASOPHILS NFR BLD: 0.2 %
BLD GP AB SCN CELLS X3 SERPL QL: NORMAL
DIFFERENTIAL METHOD: ABNORMAL
EOSINOPHIL # BLD AUTO: 0 K/UL
EOSINOPHIL NFR BLD: 0.2 %
ERYTHROCYTE [DISTWIDTH] IN BLOOD BY AUTOMATED COUNT: 12.9 %
HCT VFR BLD AUTO: 36.6 %
HGB BLD-MCNC: 12.3 G/DL
HIV 1+2 AB+HIV1 P24 AG SERPL QL IA: NEGATIVE
LYMPHOCYTES # BLD AUTO: 1.3 K/UL
LYMPHOCYTES NFR BLD: 12.2 %
MCH RBC QN AUTO: 32.4 PG
MCHC RBC AUTO-ENTMCNC: 33.6 G/DL
MCV RBC AUTO: 96 FL
MONOCYTES # BLD AUTO: 0.6 K/UL
MONOCYTES NFR BLD: 5.3 %
NEUTROPHILS # BLD AUTO: 8.6 K/UL
NEUTROPHILS NFR BLD: 81.8 %
PLATELET # BLD AUTO: 190 K/UL
PMV BLD AUTO: 11.1 FL
RBC # BLD AUTO: 3.8 M/UL
RPR SER QL: NORMAL
RPR SER QL: NORMAL
WBC # BLD AUTO: 10.51 K/UL

## 2018-10-29 PROCEDURE — 36000684 HC CESAREAN SECTION, UNSCHEDULED

## 2018-10-29 PROCEDURE — 63600175 PHARM REV CODE 636 W HCPCS: Performed by: OBSTETRICS & GYNECOLOGY

## 2018-10-29 PROCEDURE — 87081 CULTURE SCREEN ONLY: CPT

## 2018-10-29 PROCEDURE — 25000003 PHARM REV CODE 250: Performed by: STUDENT IN AN ORGANIZED HEALTH CARE EDUCATION/TRAINING PROGRAM

## 2018-10-29 PROCEDURE — 59025 FETAL NON-STRESS TEST: CPT | Mod: 26,,, | Performed by: OBSTETRICS & GYNECOLOGY

## 2018-10-29 PROCEDURE — 63600175 PHARM REV CODE 636 W HCPCS: Performed by: STUDENT IN AN ORGANIZED HEALTH CARE EDUCATION/TRAINING PROGRAM

## 2018-10-29 PROCEDURE — 86901 BLOOD TYPING SEROLOGIC RH(D): CPT

## 2018-10-29 PROCEDURE — 85025 COMPLETE CBC W/AUTO DIFF WBC: CPT

## 2018-10-29 PROCEDURE — 51702 INSERT TEMP BLADDER CATH: CPT

## 2018-10-29 PROCEDURE — 27200691 HC TRAY, EPIDURAL-SINGLE SHOT: Performed by: STUDENT IN AN ORGANIZED HEALTH CARE EDUCATION/TRAINING PROGRAM

## 2018-10-29 PROCEDURE — 59510 CESAREAN DELIVERY: CPT | Mod: ,,, | Performed by: ANESTHESIOLOGY

## 2018-10-29 PROCEDURE — 4A0HXCZ MEASUREMENT OF PRODUCTS OF CONCEPTION, CARDIAC RATE, EXTERNAL APPROACH: ICD-10-PCS | Performed by: OBSTETRICS & GYNECOLOGY

## 2018-10-29 PROCEDURE — 11000001 HC ACUTE MED/SURG PRIVATE ROOM

## 2018-10-29 PROCEDURE — 59025 FETAL NON-STRESS TEST: CPT

## 2018-10-29 PROCEDURE — S0028 INJECTION, FAMOTIDINE, 20 MG: HCPCS

## 2018-10-29 PROCEDURE — 37000008 HC ANESTHESIA 1ST 15 MINUTES: Performed by: OBSTETRICS & GYNECOLOGY

## 2018-10-29 PROCEDURE — 99285 EMERGENCY DEPT VISIT HI MDM: CPT | Mod: 25

## 2018-10-29 PROCEDURE — 0DNW0ZZ RELEASE PERITONEUM, OPEN APPROACH: ICD-10-PCS | Performed by: OBSTETRICS & GYNECOLOGY

## 2018-10-29 PROCEDURE — 59510 CESAREAN DELIVERY: CPT | Mod: AT,,, | Performed by: OBSTETRICS & GYNECOLOGY

## 2018-10-29 PROCEDURE — 99284 EMERGENCY DEPT VISIT MOD MDM: CPT | Mod: 25,,, | Performed by: OBSTETRICS & GYNECOLOGY

## 2018-10-29 PROCEDURE — 86592 SYPHILIS TEST NON-TREP QUAL: CPT

## 2018-10-29 PROCEDURE — 25000003 PHARM REV CODE 250

## 2018-10-29 PROCEDURE — 86703 HIV-1/HIV-2 1 RESULT ANTBDY: CPT

## 2018-10-29 PROCEDURE — 37000009 HC ANESTHESIA EA ADD 15 MINS: Performed by: OBSTETRICS & GYNECOLOGY

## 2018-10-29 RX ORDER — MUPIROCIN 20 MG/G
OINTMENT TOPICAL
Status: DISCONTINUED | OUTPATIENT
Start: 2018-10-29 | End: 2018-11-02 | Stop reason: HOSPADM

## 2018-10-29 RX ORDER — OXYTOCIN/RINGER'S LACTATE 20/1000 ML
333 PLASTIC BAG, INJECTION (ML) INTRAVENOUS CONTINUOUS
Status: ACTIVE | OUTPATIENT
Start: 2018-10-29 | End: 2018-10-29

## 2018-10-29 RX ORDER — DOCUSATE SODIUM 100 MG/1
200 CAPSULE, LIQUID FILLED ORAL 2 TIMES DAILY
Status: DISCONTINUED | OUTPATIENT
Start: 2018-10-29 | End: 2018-11-02 | Stop reason: HOSPADM

## 2018-10-29 RX ORDER — FAMOTIDINE 10 MG/ML
20 INJECTION INTRAVENOUS ONCE
Status: COMPLETED | OUTPATIENT
Start: 2018-10-29 | End: 2018-10-29

## 2018-10-29 RX ORDER — OXYTOCIN/RINGER'S LACTATE 20/1000 ML
10 PLASTIC BAG, INJECTION (ML) INTRAVENOUS ONCE
Status: DISCONTINUED | OUTPATIENT
Start: 2018-10-29 | End: 2018-10-29

## 2018-10-29 RX ORDER — FENTANYL CITRATE 50 UG/ML
INJECTION, SOLUTION INTRAMUSCULAR; INTRAVENOUS
Status: DISCONTINUED | OUTPATIENT
Start: 2018-10-29 | End: 2018-10-29

## 2018-10-29 RX ORDER — KETOROLAC TROMETHAMINE 30 MG/ML
30 INJECTION, SOLUTION INTRAMUSCULAR; INTRAVENOUS EVERY 6 HOURS
Status: CANCELLED | OUTPATIENT
Start: 2018-10-29 | End: 2018-10-30

## 2018-10-29 RX ORDER — OXYCODONE HYDROCHLORIDE 5 MG/1
10 TABLET ORAL EVERY 4 HOURS PRN
Status: ACTIVE | OUTPATIENT
Start: 2018-10-29 | End: 2018-10-30

## 2018-10-29 RX ORDER — MISOPROSTOL 200 UG/1
800 TABLET ORAL
Status: DISCONTINUED | OUTPATIENT
Start: 2018-10-29 | End: 2018-10-29

## 2018-10-29 RX ORDER — SODIUM CITRATE AND CITRIC ACID MONOHYDRATE 334; 500 MG/5ML; MG/5ML
30 SOLUTION ORAL
Status: DISCONTINUED | OUTPATIENT
Start: 2018-10-29 | End: 2018-10-29

## 2018-10-29 RX ORDER — KETOROLAC TROMETHAMINE 30 MG/ML
30 INJECTION, SOLUTION INTRAMUSCULAR; INTRAVENOUS EVERY 6 HOURS
Status: COMPLETED | OUTPATIENT
Start: 2018-10-29 | End: 2018-10-30

## 2018-10-29 RX ORDER — IBUPROFEN 600 MG/1
600 TABLET ORAL EVERY 6 HOURS
Status: DISCONTINUED | OUTPATIENT
Start: 2018-10-30 | End: 2018-11-02 | Stop reason: HOSPADM

## 2018-10-29 RX ORDER — OXYTOCIN/RINGER'S LACTATE 20/1000 ML
41.7 PLASTIC BAG, INJECTION (ML) INTRAVENOUS CONTINUOUS
Status: DISCONTINUED | OUTPATIENT
Start: 2018-10-29 | End: 2018-10-29

## 2018-10-29 RX ORDER — SODIUM CHLORIDE, SODIUM LACTATE, POTASSIUM CHLORIDE, CALCIUM CHLORIDE 600; 310; 30; 20 MG/100ML; MG/100ML; MG/100ML; MG/100ML
INJECTION, SOLUTION INTRAVENOUS CONTINUOUS
Status: ACTIVE | OUTPATIENT
Start: 2018-10-29 | End: 2018-10-30

## 2018-10-29 RX ORDER — OXYCODONE HYDROCHLORIDE 5 MG/1
5 TABLET ORAL EVERY 4 HOURS PRN
Status: DISPENSED | OUTPATIENT
Start: 2018-10-29 | End: 2018-10-30

## 2018-10-29 RX ORDER — SIMETHICONE 80 MG
1 TABLET,CHEWABLE ORAL EVERY 6 HOURS PRN
Status: DISCONTINUED | OUTPATIENT
Start: 2018-10-29 | End: 2018-11-02 | Stop reason: HOSPADM

## 2018-10-29 RX ORDER — ONDANSETRON HYDROCHLORIDE 2 MG/ML
INJECTION, SOLUTION INTRAMUSCULAR; INTRAVENOUS
Status: DISCONTINUED | OUTPATIENT
Start: 2018-10-29 | End: 2018-10-29

## 2018-10-29 RX ORDER — MIDAZOLAM HYDROCHLORIDE 1 MG/ML
INJECTION INTRAMUSCULAR; INTRAVENOUS
Status: DISCONTINUED | OUTPATIENT
Start: 2018-10-29 | End: 2018-10-29

## 2018-10-29 RX ORDER — ACETAMINOPHEN 325 MG/1
650 TABLET ORAL EVERY 6 HOURS
Status: DISPENSED | OUTPATIENT
Start: 2018-10-29 | End: 2018-10-30

## 2018-10-29 RX ORDER — HYDROCORTISONE 25 MG/G
CREAM TOPICAL 3 TIMES DAILY PRN
Status: DISCONTINUED | OUTPATIENT
Start: 2018-10-29 | End: 2018-11-02 | Stop reason: HOSPADM

## 2018-10-29 RX ORDER — OXYCODONE AND ACETAMINOPHEN 10; 325 MG/1; MG/1
1 TABLET ORAL EVERY 4 HOURS PRN
Status: DISCONTINUED | OUTPATIENT
Start: 2018-10-30 | End: 2018-11-02 | Stop reason: HOSPADM

## 2018-10-29 RX ORDER — MORPHINE SULFATE 0.5 MG/ML
INJECTION, SOLUTION EPIDURAL; INTRATHECAL; INTRAVENOUS
Status: DISCONTINUED | OUTPATIENT
Start: 2018-10-29 | End: 2018-10-29

## 2018-10-29 RX ORDER — OXYTOCIN 10 [USP'U]/ML
INJECTION, SOLUTION INTRAMUSCULAR; INTRAVENOUS
Status: DISCONTINUED | OUTPATIENT
Start: 2018-10-29 | End: 2018-10-29

## 2018-10-29 RX ORDER — SODIUM CHLORIDE, SODIUM LACTATE, POTASSIUM CHLORIDE, CALCIUM CHLORIDE 600; 310; 30; 20 MG/100ML; MG/100ML; MG/100ML; MG/100ML
INJECTION, SOLUTION INTRAVENOUS CONTINUOUS
Status: DISCONTINUED | OUTPATIENT
Start: 2018-10-29 | End: 2018-10-29

## 2018-10-29 RX ORDER — CEFAZOLIN SODIUM 2 G/50ML
2 SOLUTION INTRAVENOUS
Status: DISCONTINUED | OUTPATIENT
Start: 2018-10-29 | End: 2018-10-29

## 2018-10-29 RX ORDER — BUPIVACAINE HYDROCHLORIDE 7.5 MG/ML
INJECTION, SOLUTION INTRASPINAL
Status: DISCONTINUED | OUTPATIENT
Start: 2018-10-29 | End: 2018-10-29

## 2018-10-29 RX ORDER — AMOXICILLIN 250 MG
1 CAPSULE ORAL NIGHTLY PRN
Status: DISCONTINUED | OUTPATIENT
Start: 2018-10-29 | End: 2018-11-02 | Stop reason: HOSPADM

## 2018-10-29 RX ORDER — ADHESIVE BANDAGE
30 BANDAGE TOPICAL 2 TIMES DAILY PRN
Status: DISCONTINUED | OUTPATIENT
Start: 2018-10-30 | End: 2018-11-02 | Stop reason: HOSPADM

## 2018-10-29 RX ORDER — FAMOTIDINE 10 MG/ML
INJECTION INTRAVENOUS
Status: COMPLETED
Start: 2018-10-29 | End: 2018-10-29

## 2018-10-29 RX ORDER — DIPHENHYDRAMINE HCL 25 MG
25 CAPSULE ORAL EVERY 4 HOURS PRN
Status: DISCONTINUED | OUTPATIENT
Start: 2018-10-29 | End: 2018-11-02 | Stop reason: HOSPADM

## 2018-10-29 RX ORDER — ONDANSETRON 2 MG/ML
4 INJECTION INTRAMUSCULAR; INTRAVENOUS EVERY 6 HOURS PRN
Status: DISCONTINUED | OUTPATIENT
Start: 2018-10-29 | End: 2018-11-02 | Stop reason: HOSPADM

## 2018-10-29 RX ORDER — NALBUPHINE HYDROCHLORIDE 10 MG/ML
2.5 INJECTION, SOLUTION INTRAMUSCULAR; INTRAVENOUS; SUBCUTANEOUS ONCE
Status: COMPLETED | OUTPATIENT
Start: 2018-10-29 | End: 2018-10-29

## 2018-10-29 RX ORDER — KETOROLAC TROMETHAMINE 30 MG/ML
INJECTION, SOLUTION INTRAMUSCULAR; INTRAVENOUS
Status: DISCONTINUED | OUTPATIENT
Start: 2018-10-29 | End: 2018-10-29

## 2018-10-29 RX ORDER — ONDANSETRON 8 MG/1
8 TABLET, ORALLY DISINTEGRATING ORAL EVERY 8 HOURS PRN
Status: DISCONTINUED | OUTPATIENT
Start: 2018-10-29 | End: 2018-11-02 | Stop reason: HOSPADM

## 2018-10-29 RX ORDER — BISACODYL 10 MG
10 SUPPOSITORY, RECTAL RECTAL ONCE AS NEEDED
Status: ACTIVE | OUTPATIENT
Start: 2018-10-29 | End: 2018-10-29

## 2018-10-29 RX ORDER — OXYCODONE AND ACETAMINOPHEN 5; 325 MG/1; MG/1
1 TABLET ORAL EVERY 4 HOURS PRN
Status: DISCONTINUED | OUTPATIENT
Start: 2018-10-30 | End: 2018-11-02 | Stop reason: HOSPADM

## 2018-10-29 RX ORDER — ACETAMINOPHEN 10 MG/ML
INJECTION, SOLUTION INTRAVENOUS
Status: DISCONTINUED | OUTPATIENT
Start: 2018-10-29 | End: 2018-10-29

## 2018-10-29 RX ORDER — OXYTOCIN/RINGER'S LACTATE 20/1000 ML
41.65 PLASTIC BAG, INJECTION (ML) INTRAVENOUS CONTINUOUS
Status: ACTIVE | OUTPATIENT
Start: 2018-10-29 | End: 2018-10-29

## 2018-10-29 RX ADMIN — AZITHROMYCIN MONOHYDRATE 500 MG: 500 INJECTION, POWDER, LYOPHILIZED, FOR SOLUTION INTRAVENOUS at 10:10

## 2018-10-29 RX ADMIN — SODIUM CHLORIDE, SODIUM LACTATE, POTASSIUM CHLORIDE, AND CALCIUM CHLORIDE: 600; 310; 30; 20 INJECTION, SOLUTION INTRAVENOUS at 11:10

## 2018-10-29 RX ADMIN — OXYTOCIN 2 UNITS: 10 INJECTION, SOLUTION INTRAMUSCULAR; INTRAVENOUS at 11:10

## 2018-10-29 RX ADMIN — DEXTROSE 2 G: 50 INJECTION, SOLUTION INTRAVENOUS at 11:10

## 2018-10-29 RX ADMIN — ACETAMINOPHEN 1000 MG: 10 INJECTION, SOLUTION INTRAVENOUS at 11:10

## 2018-10-29 RX ADMIN — MIDAZOLAM HYDROCHLORIDE 2 MG: 1 INJECTION, SOLUTION INTRAMUSCULAR; INTRAVENOUS at 11:10

## 2018-10-29 RX ADMIN — ONDANSETRON 4 MG: 2 INJECTION, SOLUTION INTRAMUSCULAR; INTRAVENOUS at 11:10

## 2018-10-29 RX ADMIN — DIPHENHYDRAMINE HYDROCHLORIDE 25 MG: 25 CAPSULE ORAL at 05:10

## 2018-10-29 RX ADMIN — SODIUM CHLORIDE, SODIUM LACTATE, POTASSIUM CHLORIDE, AND CALCIUM CHLORIDE 1000 ML: .6; .31; .03; .02 INJECTION, SOLUTION INTRAVENOUS at 10:10

## 2018-10-29 RX ADMIN — SODIUM CITRATE AND CITRIC ACID MONOHYDRATE 30 ML: 500; 334 SOLUTION ORAL at 11:10

## 2018-10-29 RX ADMIN — OXYTOCIN 12 UNITS: 10 INJECTION, SOLUTION INTRAMUSCULAR; INTRAVENOUS at 12:10

## 2018-10-29 RX ADMIN — ACETAMINOPHEN 650 MG: 325 TABLET ORAL at 06:10

## 2018-10-29 RX ADMIN — FENTANYL CITRATE 10 MCG: 50 INJECTION, SOLUTION INTRAMUSCULAR; INTRAVENOUS at 11:10

## 2018-10-29 RX ADMIN — FAMOTIDINE 20 MG: 10 INJECTION INTRAVENOUS at 11:10

## 2018-10-29 RX ADMIN — KETOROLAC TROMETHAMINE 30 MG: 30 INJECTION, SOLUTION INTRAMUSCULAR at 06:10

## 2018-10-29 RX ADMIN — DOCUSATE SODIUM 200 MG: 100 CAPSULE, LIQUID FILLED ORAL at 08:10

## 2018-10-29 RX ADMIN — KETOROLAC TROMETHAMINE 30 MG: 30 INJECTION, SOLUTION INTRAMUSCULAR; INTRAVENOUS at 11:10

## 2018-10-29 RX ADMIN — FAMOTIDINE 20 MG: 10 INJECTION, SOLUTION INTRAVENOUS at 11:10

## 2018-10-29 RX ADMIN — Medication 0.15 MG: at 11:10

## 2018-10-29 RX ADMIN — NALBUPHINE HYDROCHLORIDE 2.5 MG: 10 INJECTION, SOLUTION INTRAMUSCULAR; INTRAVENOUS; SUBCUTANEOUS at 08:10

## 2018-10-29 RX ADMIN — BUPIVACAINE HYDROCHLORIDE IN DEXTROSE 1.6 ML: 7.5 INJECTION, SOLUTION SUBARACHNOID at 11:10

## 2018-10-29 NOTE — LACTATION NOTE
10/29/18 5679   Maternal Infant Assessment   Breast Density Bilateral:;soft   Areola Bilateral:;elastic   Nipple(s) Bilateral:;everted   Infant Assessment   Mouth Size small   Sucking Reflex present   Rooting Reflex present   Swallow Reflex present   LATCH Score   Latch 1-->repeated attempts, holds nipple in mouth, stimulate to suck   Audible Swallowing 1-->a few with stimulation   Type Of Nipple 2-->everted (after stimulation)   Comfort (Breast/Nipple) 2-->soft/nontender   Hold (Positioning) 1-->minimal assist, teach one side: mother does other, staff holds   Score (less than 7 for 2/more consecutive times, consult Lactation Consultant) 7   Breasts WDL   Breasts WDL WDL       Number Scale   Presence of Pain denies   Location nipple(s)   Pain Rating: Rest 0   Pain Rating: Activity 0   Maternal Infant Feeding   Maternal Emotional State assist needed;relaxed   Signs of Milk Transfer infant jaw motion present   Presence of Pain no   Comfort Measures Before/During Feeding infant position adjusted;latch adjusted;maternal position adjusted   Time Spent (min) 15-30 min   Latch Assistance yes   Breastfeeding Education importance of skin-to-skin contact;milk expression, hand   Feeding Infant   Feeding Tolerance/Success arousal required   Effective Latch During Feeding yes   Skin-to-Skin Contact During Feeding yes   Lactation Referrals   Lactation Consult Breastfeeding assessment   Lactation Interventions   Attachment Promotion breastfeeding assistance provided   Breastfeeding Assistance assisted with positioning;feeding cue recognition promoted;feeding on demand promoted;infant latch-on verified;infant suck/swallow verified   Maternal Breastfeeding Support diary/feeding log utilized;encouragement offered;infant-mother separation minimized;lactation counseling provided;maternal hydration promoted;maternal nutrition promoted;maternal rest encouraged   Latch Promotion positioning assisted   lactation rounds. Basic education  reviewed. Latch assistance provided. Assisted pt with waking infant. Placed football hold, infant roots and opens wide with stimulation. Good tugs and pulls with breast compression and infant stimulation. Encouraged pt to use hand expression once infant comes off the breast, spoon feed extra colostrum. Reviewed feeding behaviors of 36 week infants.

## 2018-10-29 NOTE — ANESTHESIA PREPROCEDURE EVALUATION
10/29/2018  Maria Eugenia Briscoe is a 37 y.o., female     OB History    Para Term  AB Living   3 1 1   1 1   SAB TAB Ectopic Multiple Live Births   1     0 1      # Outcome Date GA Lbr Patrick/2nd Weight Sex Delivery Anes PTL Lv   3 Current            2 Term 08/10/15 39w3d  3.05 kg (6 lb 11.6 oz) M CS-LTranv Spinal N PANDA   1 SAB  6w0d                 Wt Readings from Last 1 Encounters:   10/29/18 0725 81.2 kg (179 lb)       BP Readings from Last 3 Encounters:   10/29/18 122/66   10/18/18 108/68   10/08/18 116/64       Patient Active Problem List   Diagnosis    History of  section - Repeat C/S at 37 weeks (10/29)    History of myomectomy    Elderly multigravida in third trimester       Past Surgical History:   Procedure Laterality Date     SECTION      DELIVERY-CEASAREAN SECTION N/A 8/10/2015    Performed by Yemi Arredondo III, MD at [facility] L&D    MYOMECTOMY      MYOMECTOMY  2010       Social History     Socioeconomic History    Marital status:      Spouse name: Not on file    Number of children: Not on file    Years of education: Not on file    Highest education level: Not on file   Social Needs    Financial resource strain: Not on file    Food insecurity - worry: Not on file    Food insecurity - inability: Not on file    Transportation needs - medical: Not on file    Transportation needs - non-medical: Not on file   Occupational History    Not on file   Tobacco Use    Smoking status: Never Smoker    Smokeless tobacco: Never Used   Substance and Sexual Activity    Alcohol use: No     Comment: rarely    Drug use: No    Sexual activity: Yes     Partners: Male     Birth control/protection: None   Other Topics Concern    Not on file   Social History Narrative    Not on file         Chemistry        Component Value Date/Time     2018 1121    K 3.4  (L) 04/16/2018 1121     04/16/2018 1121    CO2 23 04/16/2018 1121    BUN 9 04/16/2018 1121    CREATININE 0.6 04/16/2018 1121    GLU 79 04/16/2018 1121        Component Value Date/Time    CALCIUM 9.4 04/16/2018 1121    ALKPHOS 44 (L) 03/28/2018 1403    AST 19 03/28/2018 1403    ALT 11 03/28/2018 1403    BILITOT 0.3 03/28/2018 1403    ESTGFRAFRICA >60 04/16/2018 1121    EGFRNONAA >60 04/16/2018 1121            Lab Results   Component Value Date    WBC 9.67 08/06/2018    HGB 12.1 08/06/2018    HCT 36.5 (L) 08/06/2018     (H) 08/06/2018     08/06/2018       No results for input(s): PT, INR, PROTIME, APTT in the last 72 hours.      Anesthesia Evaluation    I have reviewed the Patient Summary Reports.     I have reviewed the Medications.     Review of Systems  Anesthesia Hx:  No problems with previous Anesthesia    Cardiovascular:  Cardiovascular Normal     Pulmonary:  Pulmonary Normal    Renal/:  Renal/ Normal     Hepatic/GI:  Hepatic/GI Normal    Musculoskeletal:  Musculoskeletal Normal    Neurological:  Neurology Normal    Endocrine:  Endocrine Normal        Physical Exam  General:  Well nourished    Airway/Jaw/Neck:  Airway Findings: Mouth Opening: Normal Tongue: Normal  Mallampati: II  Improves to I with phonation.  TM Distance: Normal, at least 6 cm  Jaw/Neck Findings:  Neck ROM: Normal ROM     Eyes/Ears/Nose:  EYES/EARS/NOSE FINDINGS: Normal   Dental:  Dental Findings: Upper Dentures, Lower Dentures   Chest/Lungs:  Chest/Lungs Findings: Normal Respiratory Rate     Heart/Vascular:  Heart Findings: Rate: Normal  Rhythm: Regular Rhythm        Mental Status:  Mental Status Findings: Normal        Anesthesia Plan  Type of Anesthesia, risks & benefits discussed:  Anesthesia Type:  CSE, epidural, spinal, general  Patient's Preference:   Intra-op Monitoring Plan: standard ASA monitors  Intra-op Monitoring Plan Comments:   Post Op Pain Control Plan:   Post Op Pain Control Plan Comments:   Induction:    IV  Beta Blocker:  Patient is not currently on a Beta-Blocker (No further documentation required).       Informed Consent: Patient understands risks and agrees with Anesthesia plan.  Questions answered. Anesthesia consent signed with patient.  ASA Score: 2     Day of Surgery Review of History & Physical:    H&P update referred to the surgeon.         Ready For Surgery From Anesthesia Perspective.

## 2018-10-29 NOTE — ANESTHESIA PROCEDURE NOTES
Spinal    Diagnosis: intrauterine pregnancy  Patient location during procedure: OR  Start time: 10/29/2018 11:15 AM  Timeout: 10/29/2018 11:15 AM  End time: 10/29/2018 11:29 AM  Staffing  Anesthesiologist: Risa Marcus MD  Resident/CRNA: Marco Mcgill MD  Performed: resident/CRNA   Preanesthetic Checklist  Completed: patient identified, site marked, surgical consent, pre-op evaluation, timeout performed, IV checked, risks and benefits discussed and monitors and equipment checked  Spinal Block  Patient position: sitting  Prep: ChloraPrep  Patient monitoring: heart rate and continuous pulse ox  Approach: midline  Location: L3-4  Injection technique: single shot  CSF Fluid: clear free-flowing CSF  Needle  Needle type: Anderson   Needle gauge: 25 G  Needle length: 3.5 in  Additional Documentation: negative aspiration for heme and no paresthesia on injection  Needle localization: anatomical landmarks  Assessment  Sensory level: T4   Dermatomal levels determined by pinch or prick  Ease of block: easy  Patient's tolerance of the procedure: comfortable throughout block and no complaints

## 2018-10-29 NOTE — PROGRESS NOTES
..2017.561 TXA Consent  Maria Eugenia Briscoe was randomized for TXA. Her screening number is [id number] and she received study drug #[id number]. The study drug was removed from med room at 11:21. She was randomized on 10/29/2018 at 11:44. Cord clamp at 11:45 and study drug started at 11:48.

## 2018-10-29 NOTE — ED PROVIDER NOTES
Encounter Date: 10/29/2018       History     Chief Complaint   Patient presents with    Rupture of Membranes     0630, clear     HPI   Maria Eugenia Briscoe is a 37 y.o. [id number] at 36w2d presents complaining of rupture of membranes at 0630 this AM. In [place]EL[place], patient grossly ruptured, pooling onto bed.     This IUP is complicated by h/o CS,  H/o myomectomy and AMA.  Patient denies contractions, denies vaginal bleeding, denies LOF.   Fetal Movement: normal.     Review of patient's allergies indicates:  No Known Allergies  Past Medical History:   Diagnosis Date    Glaucoma     Pregnancy      Past Surgical History:   Procedure Laterality Date     SECTION      DELIVERY-CEASAREAN SECTION N/A 8/10/2015    Performed by Yemi Arredondo III, MD at [facility] L&D    MYOMECTOMY      MYOMECTOMY  2010     Family History   Problem Relation Age of Onset    Breast cancer Paternal Aunt     Glaucoma Father     Glaucoma Paternal Grandmother     Colon cancer Neg Hx     Ovarian cancer Neg Hx     Amblyopia Neg Hx     Blindness Neg Hx     Cataracts Neg Hx     Macular degeneration Neg Hx     Retinal detachment Neg Hx     Strabismus Neg Hx      Social History     Tobacco Use    Smoking status: Never Smoker    Smokeless tobacco: Never Used   Substance Use Topics    Alcohol use: No     Comment: rarely    Drug use: No     Review of Systems   Constitutional: Negative for chills, diaphoresis and fever.   HENT: Negative for congestion, rhinorrhea, sneezing and sore throat.    Eyes: Negative for photophobia and visual disturbance.   Respiratory: Negative for cough, shortness of breath and wheezing.    Cardiovascular: Negative for chest pain, palpitations and leg swelling.   Gastrointestinal: Negative for constipation, diarrhea, nausea and vomiting.   Genitourinary: Negative for dysuria, vaginal bleeding and vaginal discharge.   Musculoskeletal: Negative for arthralgias, back pain and joint swelling.   Neurological: Negative  for tremors, light-headedness and headaches.   Psychiatric/Behavioral: Negative for self-injury, sleep disturbance and suicidal ideas. The patient is not nervous/anxious.        Physical Exam     Initial Vitals   BP Pulse Resp Temp SpO2   10/29/18 0718 10/29/18 0718 10/29/18 0718 10/29/18 0718 10/29/18 0719   122/66 84 18 98.7 °F (37.1 °C) 99 %      MAP       --                Physical Exam    Vitals reviewed.  Constitutional: She appears well-developed and well-nourished. She is not diaphoretic. No distress.   Cardiovascular: Normal rate, regular rhythm and intact distal pulses.   Pulmonary/Chest: Breath sounds normal. No respiratory distress. She exhibits no tenderness.   Abdominal: Soft. She exhibits no distension. There is no tenderness.   Neurological: She is alert and oriented to person, place, and time. No cranial nerve deficit.   Skin: Skin is warm and dry. Capillary refill takes less than 2 seconds.   Psychiatric: She has a normal mood and affect. Her behavior is normal. Judgment and thought content normal.     OB LABOR EXAM:   Pre-Term Labor: No.   Membranes ruptured: Yes.   Method: Sterile vaginal exam per MD.   Vaginal Bleeding: none present.   Engagement: ballotable/floating.   Dilatation: 0.   Station: -4.   Effacement: 40%.   Amniotic Fluid Color: normal.   Amniotic Fluid Amount: moderate.   Comments: Grossly ruptured, pooling amniotic fluid onto bed       ED Course   Obtain Fetal nonstress test (NST)  Date/Time: 10/29/2018 8:43 AM  Performed by: Betty Cowart MD  Authorized by: Betty Cowart MD     Nonstress Test:     Variability:  6-25 BPM    Decelerations:  None    Accelerations:  15 bpm    Acoustic Stimulator: No      Baseline:  140    Uterine Irritability: No      Contractions:  Not present  Biophysical Profile:     Nonstress Test Interpretation: reactive      Overall Impression:  Reassuring      Labs Reviewed   STREP B SCREEN, VAGINAL / RECTAL   RPR   HIV 1 / 2 ANTIBODY          Imaging  Results    None          Medical Decision Making:   History:   Old Medical Records: I decided to obtain old medical records.  Old Records Summarized: records from clinic visits.  ED Management:  Grossly ruptured, pooling onto bed  NST: 140, reactive/reassuring. CTX: none  SVE: closed/thick/high  Given h/o myomectomy and h/o CS x1, patient to OR for repeat CS today. Consents signed, questions answered. Staff aware. Will collect GBS and T3 labs.               Attending Attestation:   Physician Attestation Statement for Resident:  As the supervising MD   Physician Attestation Statement: I have personally seen and examined this patient.   I agree with the above history. -:   As the supervising MD I agree with the above PE.    As the supervising MD I agree with the above treatment, course, plan, and disposition.   -:   NST  I independently reviewed the fetal non-stress test with the following interpretation:  140 BPM baseline  Variability: moderate  Accelerations: present  Decelerations: absent  Contractions: none  Category 1    Clinical Interpretation:reactive    Patient evaluated and found to be stable, agree with resident's assessment of  SROM in a patient s/p myomectomy and plan to admit for repeat  delivery. Will give GBS prophylaxis due to unknown GBS.  I was personally present during the critical portions of the procedure(s) performed by the resident and was immediately available in the ED to provide services and assistance as needed during the entire procedure.  I have reviewed the following: old records at this facility.                       Clinical Impression:   The primary encounter diagnosis was Leakage of amniotic fluid. Diagnoses of 36 weeks gestation of pregnancy, History of myomectomy, and History of  section were also pertinent to this visit.                             Betty Cowart MD  Resident  10/29/18 [id number]       Heather Delatorre MD  10/29/18 [id number]

## 2018-10-29 NOTE — H&P
HISTORY AND PHYSICAL                                                OBSTETRICS          Subjective:     HPI   Maria Eugenia Briscoe is a 37 y.o. [id number] at 36w2d presents complaining of rupture of membranes at 0630 this AM. In [place]ELIA, patient grossly ruptured, pooling onto bed.      This IUP is complicated by h/o CS,  H/o myomectomy and AMA.  Patient denies contractions, denies vaginal bleeding, reports LOF.   Fetal Movement: normal.       PMHx:   Past Medical History:   Diagnosis Date    Glaucoma     Pregnancy        PSHx:   Past Surgical History:   Procedure Laterality Date     SECTION      DELIVERY-CEASAREAN SECTION N/A 8/10/2015    Performed by [facility] at [facility] L&D    MYOMECTOMY      MYOMECTOMY         All: Review of patient's allergies indicates:  No Known Allergies    Meds:   (Not in a hospital admission)    SH:   Social History     Socioeconomic History    Marital status:      Spouse name: Not on file    Number of children: Not on file    Years of education: Not on file    Highest education level: Not on file   Social Needs    Financial resource strain: Not on file    Food insecurity - worry: Not on file    Food insecurity - inability: Not on file    Transportation needs - medical: Not on file    Transportation needs - non-medical: Not on file   Occupational History    Not on file   Tobacco Use    Smoking status: Never Smoker    Smokeless tobacco: Never Used   Substance and Sexual Activity    Alcohol use: No     Comment: rarely    Drug use: No    Sexual activity: Yes     Partners: Male     Birth control/protection: None   Other Topics Concern    Not on file   Social History Narrative    Not on file       FH:   Family History   Problem Relation Age of Onset    Breast cancer Paternal Aunt     Glaucoma Father     Glaucoma Paternal Grandmother     Colon cancer Neg Hx     Ovarian cancer Neg Hx     Amblyopia Neg Hx     Blindness Neg Hx     Cataracts  "Neg Hx     Macular degeneration Neg Hx     Retinal detachment Neg Hx     Strabismus Neg Hx        OBHx:   Obstetric History       T1      L1     SAB1   TAB0   Ectopic0   Multiple0   Live Births1       # Outcome Date GA Lbr Patrick/2nd Weight Sex Delivery Anes PTL Lv   3 Current            2 Term 08/10/15 39w3d  3.05 kg (6 lb 11.6 oz) M CS-LTranv Spinal N PANDA      Apgar1:  9                Apgar5: 9   1 SAB  6w0d                 Objective:       /66   Pulse 91   Temp 98.7 °F (37.1 °C)   Resp 18   Ht 5' 4" (1.626 m)   Wt 81.2 kg (179 lb)   LMP 2018   SpO2 99%   Breastfeeding? No   BMI 30.73 kg/m²     Vitals:    10/29/18 0718 10/29/18 0719 10/29/18 0724 10/29/18 0725   BP: 122/66      Pulse: 84 88 91    Resp: 18      Temp: 98.7 °F (37.1 °C)      SpO2:  99% 99%    Weight:    81.2 kg (179 lb)   Height:    5' 4" (1.626 m)       General:   alert, appears stated age and cooperative   Lungs:   clear to auscultation bilaterally   Heart:   regular rate and rhythm, S1, S2 normal, no murmur, click, rub or gallop   Abdomen:  soft, non-tender; bowel sounds normal; no masses,  no organomegaly   Extremities negative edema, negative erythema   FHT: 140, +accels, no decels, moderate btbv, Cat 1 (reassuring)                 TOCO: none   Presentations: cephalic by ultrasound   Cervix:     Dilation: Closed    Effacement: 40%    Station:  -3    Consistency: medium    Position: posterior     Lab Review  Blood Type A POS  GBBS: unknown, will collect  Rubella: Immune  RPR: Non-reactive  HIV: negative  HepB: negative       Assessment:       36w2d weeks gestation who presents with SROM    Active Hospital Problems    Diagnosis  POA    *History of  section - Repeat C/S at 37 weeks (10/29) [Z98.891]  Not Applicable    36 weeks gestation of pregnancy [Z3A.36]  Not Applicable    History of  premature rupture of membranes (PPROM) [Z87.59]  Not Applicable    Elderly multigravida in third trimester " [O09.523]  Yes    History of myomectomy [Z98.890]  Not Applicable      Resolved Hospital Problems   No resolved problems to display.          Plan:   1. SROM in setting of previous CS x1 and h/o myomectomy   Risks, benefits, alternatives and possible complications have been discussed in detail with the patient.   - Consents signed and to chart - to OR for repeat   - Admit to Labor and Delivery unit  - US: fetus confirmed to be vertex  - Epidural per Anesthesia   - Draw CBC, T&S, RPR and HIV  - Will collect GBS given GBS unknown  - Notify Staff - Dr. Neumann aware    Post-Partum Hemorrhage risk - medium        Betty Cowart MD  OB/GYN  PGY-1

## 2018-10-29 NOTE — PLAN OF CARE
Problem: Breastfeeding (Adult,Obstetrics,Pediatric)  Goal: Signs and Symptoms of Listed Potential Problems Will be Absent, Minimized or Managed (Breastfeeding)  Signs and symptoms of listed potential problems will be absent, minimized or managed by discharge/transition of care (reference Breastfeeding (Adult,Obstetrics,Pediatric) CPG).   10/29/18 1707   Breastfeeding   Problems Assessed (Breastfeeding) all   Problems Present (Breastfeeding) none   Follow basic education.

## 2018-10-29 NOTE — L&D DELIVERY NOTE
Section Procedure Note    Procedure:   1. Repeat  Section via Pfannenstiel skin incision  2. Lysis of adhesions - less than 5 minutes    Indications:   1. History of  section x1   2. History of Myomectomy  3. PPROM    Pre-operative Diagnosis:   1. IUP at 36 week 2 day pregnancy  2. PPROM  3. H/o  section x1  4. H/o myomectomy  5. AMA    Post-operative Diagnosis:   same    Surgeon: Dr. Kristen Neumann     Assistants: Catrina Hudson MD - PGY1    Anesthesia: Spinal anesthesia    Findings:    1. Single viable  male infant, with APGARS 9/9, weight 2500g.   2. Uterine adhesion to large bowel at right cornua, taken down.  3. Normal appearing ovaries and fallopian tubes.  4. Normal placenta.    Estimated Blood Loss:  435 mL           Total IV Fluids: 300 mL     UOP: 350 mL    Specimens: None.    PreOp CBC:   Lab Results   Component Value Date    WBC 10.51 10/29/2018    HGB 12.3 10/29/2018    HCT 36.6 (L) 10/29/2018    MCV 96 10/29/2018     10/29/2018                     Complications:  None; patient tolerated the procedure well.           Disposition: PACU - hemodynamically stable.           Condition: stable    Procedure Details   The patient was seen in the Holding Room. The risks, benefits, complications, treatment options, and expected outcomes were discussed with the patient.  The patient concurred with the proposed plan, giving informed consent.  The patient was taken to Operating Room, identified as Maria Eugenia Briscoe and the procedure verified as  Delivery. A Time Out was held and the above information confirmed.    After induction of anesthesia, the patient was prepped and draped in the usual sterile manner while placed in a dorsal supine position with a left lateral tilt.  A hernández catheter was also placed per nursing. Preoperative antibiotics Ancef 2 g and azithromycin 500 mg were administered. An allis test was performed confirming adequate anesthesia.  A  Pfannenstiel incision was made and carried down through the subcutaneous tissue to the fascia. Fascial incision was made and extended transversely. The fascia was grasped with [facility] clamps and  from the underlying rectus tissue superiorly and inferiorly. The peritoneum was identified, found to be free of adherent bowel, and entered. Peritoneal incision was extended longitudinally. The vesico-uterine peritoneum was identified, and bladder blade was inserted. A low transverse uterine incision was made with knife and extended. Theinfant was noted to be in cephalic presentation. The head was brought to the incision and elevated out of the pelvis. The patient delivered a single viable male infant without difficulty.  Infant weighed 2500 grams with Apgar scores of 9/9 at one and five minutes respectively. After the umbilical cord was clamped and cut, cord blood was obtained for evaluation. The placenta was delivered spontaneously, intact, appeared normal, and was discarded. The uterus was exteriorized. An adhesion from the right cornua to the large bowel was noted and taken down with the Bovie and Garvin. The uterine incision was closed with running locked sutures of #1 chromic. Hemostasis was observed. The uterine outline, tubes and ovaries appeared normal. The uterus was returned to the abdominal cavity. Incision was reinspected and good hemostasis was noted. The abdominal cavity was irrigated to remove clots. The peritoneum was reapproximated with 2-0 vicryl. Muscle was reapproximated with #1 chromic. The fascia was then reapproximated with running sutures #1 PDS. The subcutaneous fat was reapproximated with 2-0 vicryl, and skin was reapproximated with 4-0 monocryl.    Instrument, sponge, and needle counts were correct prior the abdominal closure and at the conclusion of the case.     Pt tolerated procedure well and was in stable condition after the procedure.      Catrina Hudson MD  OBGYN, PGY-1     Attending  statement    I was present and scrubbed for the entire surgical procedure    Kristen Neumann MD, FACOG  Obstetrics and Gynecology        Delivery Information for  Mp Briscoe    Birth information:  YOB: 2018   Time of birth: 11:44 AM   Sex: male   Head Delivery Date/Time: 10/29/2018 11:44 AM   Delivery type: , Low Transverse   Gestational Age: 36w2d    Delivery Providers    Delivering clinician:  Kristen Neumann MD   Provider Role    Tess Basilio, MD Naomi Garcia, CST     MD Suzi Hays MD Lauren Griffiths, RN             Measurements    Weight:  2500 g  Length:           Apgars    Living status:  Living  Apgars:   1 min.:   5 min.:   10 min.:   15 min.:   20 min.:     Skin color:   1  1       Heart rate:   2  2       Reflex irritability:   2  2       Muscle tone:   2  2       Respiratory effort:   2  2       Total:   9  9       Apgars assigned by:  LOW BGALEY         Operative Delivery    Forceps attempted?:  No  Vacuum extractor attempted?:  No         Shoulder Dystocia    Shoulder dystocia present?:  No           Presentation    Presentation:  Vertex           Interventions/Resuscitation    Method:  Bulb Suctioning       Cord    Vessels:  3 vessels  Complications:  None  Delayed Cord Clamping?:  No  Cord Clamped Date/Time:  10/29/2018 11:44 AM  Cord Blood Disposition:  Sent with Baby  Gases Sent?:  No       Placenta    Placenta delivery date/time:  10/29/2018 1145  Placenta removal:  Manual removal  Placenta appearance:  Intact  Placenta disposition:  discarded           Labor Events:       labor: Yes     Labor Onset Date/Time:         Dilation Complete Date/Time:         Start Pushing Date/Time:       Rupture Date/Time:              Rupture type:           Fluid Amount:        Fluid Color:        Fluid Odor:        Membrane Status (PeriCalm): PROM (Premature Rupture)      Rupture  Date/Time (PeriCalm): 10/29/2018 06:30:00      Fluid Amount (PeriCalm): Moderate      Fluid Color (PeriCalm): Clear       steroids: None     Antibiotics given for GBS: Yes     Induction:       Indications for induction:        Augmentation:       Indications for augmentation:       Labor complications: None     Additional complications:          Cervical ripening:                     Delivery:      Episiotomy:       Indication for Episiotomy:       Perineal Lacerations:   Repaired:      Periurethral Laceration:   Repaired:     Labial Laceration:   Repaired:     Sulcus Laceration:   Repaired:     Vaginal Laceration:   Repaired:     Cervical Laceration:   Repaired:     Repair suture:       Repair # of packets:       Vaginal delivery QBL (mL): 0      QBL (mL): 435     Combined Blood Loss (mL): 435     Vaginal Sweep Performed:       S[facility] Correct:         Other providers:       Anesthesia    Method:  Spinal          Details (if applicable):  Trial of Labor No    Categorization: Repeat    Priority: Routine   Indications for : Repeat Section   Incision Type: low transverse     Additional  information:  Forceps:    Vacuum:    Breech:    Observed anomalies    Other (Comments):

## 2018-10-29 NOTE — DISCHARGE INSTRUCTIONS
Breastfeeding Discharge Instructions       Feed the baby at the earliest sign of hunger or comfort  o Hands to mouth, sucking motions  o Rooting or searching for something to suck on  o Dont wait for crying - it is a sign of distress     The feedings may be 8-12 times per 24hrs and will not follow a schedule   Avoid pacifiers and bottles for the first 4 weeks   Alternate the breast you start the feeding with, or start with the breast that feels the fullest   Switch breasts when the baby takes himself off the breast or falls asleep   Keep offering breasts until the baby looks full, no longer gives hunger signs, and stays asleep when placed on his back in the crib   If the baby is sleepy and wont wake for a feeding, put the baby skin-to-skin dressed in a diaper against the mothers bare chest   Sleep near your baby   The baby should be positioned and latched on to the breast correctly  o Chest-to-chest, chin in the breast  o Babys lips are flipped outward  o Babys mouth is stretched open wide like a shout  o Babys sucking should feel like tugging to the mother  - The baby should be drinking at the breast:  o You should hear swallowing or gulping throughout the feeding  o You should see milk on the babys lips when he comes off the breast  o Your breasts should be softer when the baby is finished feeding  o The baby should look relaxed at the end of feedings  o After the 4th day and your milk is in:  o The babys poop should turn bright yellow and be loose, watery, and seedy  o The baby should have at least 3-4 poops the size of the palm of your hand per day  o The baby should have at least 5-6 wet diapers per day  o The urine should be light yellow in color  You should drink when you are thirsty and eat a healthy diet when you are    hungry.     Take naps to get the rest you need.   Take medications and/or drink alcohol only with permission of your obstetrician    or the babys pediatrician.  You can  also call the [facility],   ([phone]), Monday-Friday, 8am-5pm Central time, to get the most   up-to-date evidence-based information on the use of medications during   pregnancy and breastfeeding.      The baby should be examined by a pediatrician at 3-5 days of age.  Once your   milk comes in, the baby should be gaining at least ½ - 1oz each day and should be back to birthweight no later than 10-14 days of age.          Community Resources    [facility] Breastfeeding Warmline: [phone]   Local [facility] clinics: provide incentives and breastpumps to eligible mothers  [employer] ([employer]):  mother-to-mother support group website        www.payasUgyml.VOZ  Local [employer] mother-to-mother support groups:        www.Thumb Arcade        [employer] [facility]   Dr. Umesh Tatum website for latch videos and general information:        www.breastfeedinginc.ca  [facility] is a call center that provides information about the safety of taking medications while breastfeeding.  Call 1-893.437.2827, M-F, 8am-5pm, [place].  International [employer]actation [employer] Association provides resources for assistance:        www.ilca.org  [place] [employer]feeding [employer] provides informationand resources for parents  and the community    www.LaBreastfeedingSupport.org     Kim Rios is a mom-to-mom support group:                             www.nolanesting.CustEx//breastfeedng-support/  [employer] Healthy Babies:  7-510-120-BABY(5868)  [employer]: a breastfeeding support group for women of color, 310.191.4028          Primary Engorgement    If the milk is flowing, use wet or dry heat applied to the breasts for approximately 10min prior to each feeding as a comfort measure to facilitate the milk ejection reflex    Follow heat treatment with breast massage to soften hard/lumpy areas of the breast    Use unrestricted, frequent, effective feedings.      Wake baby to feed if  necessary    Avoid pacifier and bottle feedings    Hand express or pump breasts to the point of comfort prn    Use cold treatments in the form of ice packs/gel packs/ frozen vegetables wrapped in a soft thin cloth and applied to the breasts for approximately 20min after each feeding until engorgement is resolved    Wear comfortable, supportive bra    Take pain medicine prn    Use anti-inflammatory medications if prescribed by physician    Other:

## 2018-10-29 NOTE — PROGRESS NOTES
TXA 2017.561 Consent     Ms. Briscoe was admitted on 10/29/18 for delivery. I approached her in [place]. We discussed the study in detail, including the purpose, numbers/length, procedures, risk/benefit, cost/payment, contacts (investigators/[facility]), alternatives/voluntary nature/withdrawal, confidentiality/HIPPA. Dr. Bryant was available for questions. She verbalized understanding and had no questions. She consented to be contacted in the future. The consent form was signed on 10/29/18 at 09:20. She will be randomized today.      Patient was given a copy of signed informed consent.

## 2018-10-29 NOTE — TRANSFER OF CARE
"Anesthesia Transfer of Care Note    Patient: Maria Eugenia Briscoe    Procedure(s) Performed: Procedure(s) (LRB):   SECTION (N/A)    Patient location: Labor and Delivery    Anesthesia Type: spinal    Transport from OR: Transported from OR on room air with adequate spontaneous ventilation    Post pain: adequate analgesia    Post assessment: no apparent anesthetic complications    Post vital signs: stable    Level of consciousness: awake    Nausea/Vomiting: no nausea/vomiting    Complications: none    Transfer of care protocol was followed      Last vitals:   Visit Vitals  /66   Pulse 91   Temp 37.1 °C (98.7 °F)   Resp 18   Ht 5' 4" (1.626 m)   Wt 81.2 kg (179 lb)   LMP 2018   SpO2 99%   Breastfeeding? Unknown   BMI 30.73 kg/m²     "

## 2018-10-30 LAB
BASOPHILS # BLD AUTO: 0.02 K/UL
BASOPHILS NFR BLD: 0.2 %
DIFFERENTIAL METHOD: ABNORMAL
EOSINOPHIL # BLD AUTO: 0 K/UL
EOSINOPHIL NFR BLD: 0.2 %
ERYTHROCYTE [DISTWIDTH] IN BLOOD BY AUTOMATED COUNT: 12.8 %
HCT VFR BLD AUTO: 30.8 %
HGB BLD-MCNC: 10.4 G/DL
LYMPHOCYTES # BLD AUTO: 1 K/UL
LYMPHOCYTES NFR BLD: 8.6 %
MCH RBC QN AUTO: 32.6 PG
MCHC RBC AUTO-ENTMCNC: 33.8 G/DL
MCV RBC AUTO: 97 FL
MONOCYTES # BLD AUTO: 0.7 K/UL
MONOCYTES NFR BLD: 5.8 %
NEUTROPHILS # BLD AUTO: 9.9 K/UL
NEUTROPHILS NFR BLD: 84.9 %
PLATELET # BLD AUTO: 184 K/UL
PMV BLD AUTO: 11 FL
RBC # BLD AUTO: 3.19 M/UL
WBC # BLD AUTO: 11.65 K/UL

## 2018-10-30 PROCEDURE — 36415 COLL VENOUS BLD VENIPUNCTURE: CPT

## 2018-10-30 PROCEDURE — 85025 COMPLETE CBC W/AUTO DIFF WBC: CPT

## 2018-10-30 PROCEDURE — 99024 POSTOP FOLLOW-UP VISIT: CPT | Mod: ,,, | Performed by: OBSTETRICS & GYNECOLOGY

## 2018-10-30 PROCEDURE — 25000003 PHARM REV CODE 250: Performed by: STUDENT IN AN ORGANIZED HEALTH CARE EDUCATION/TRAINING PROGRAM

## 2018-10-30 PROCEDURE — 63600175 PHARM REV CODE 636 W HCPCS: Performed by: OBSTETRICS & GYNECOLOGY

## 2018-10-30 PROCEDURE — 11000001 HC ACUTE MED/SURG PRIVATE ROOM

## 2018-10-30 RX ADMIN — IBUPROFEN 600 MG: 600 TABLET ORAL at 12:10

## 2018-10-30 RX ADMIN — KETOROLAC TROMETHAMINE 30 MG: 30 INJECTION, SOLUTION INTRAMUSCULAR at 05:10

## 2018-10-30 RX ADMIN — KETOROLAC TROMETHAMINE 30 MG: 30 INJECTION, SOLUTION INTRAMUSCULAR at 12:10

## 2018-10-30 RX ADMIN — DIPHENHYDRAMINE HYDROCHLORIDE 25 MG: 25 CAPSULE ORAL at 12:10

## 2018-10-30 RX ADMIN — OXYCODONE HYDROCHLORIDE AND ACETAMINOPHEN 1 TABLET: 5; 325 TABLET ORAL at 04:10

## 2018-10-30 RX ADMIN — ACETAMINOPHEN 650 MG: 325 TABLET ORAL at 12:10

## 2018-10-30 RX ADMIN — IBUPROFEN 600 MG: 600 TABLET ORAL at 06:10

## 2018-10-30 RX ADMIN — DOCUSATE SODIUM 200 MG: 100 CAPSULE, LIQUID FILLED ORAL at 08:10

## 2018-10-30 RX ADMIN — ACETAMINOPHEN 650 MG: 325 TABLET ORAL at 05:10

## 2018-10-30 RX ADMIN — OXYCODONE HYDROCHLORIDE 5 MG: 5 TABLET ORAL at 09:10

## 2018-10-30 RX ADMIN — OXYCODONE HYDROCHLORIDE AND ACETAMINOPHEN 1 TABLET: 5; 325 TABLET ORAL at 09:10

## 2018-10-30 NOTE — ANESTHESIA POSTPROCEDURE EVALUATION
"Anesthesia Post Evaluation    Patient: Maria Eugenia Briscoe    Procedure(s) Performed: Procedure(s) (LRB):   SECTION (N/A)    Final Anesthesia Type: spinal  Patient location during evaluation: floor  Patient participation: Yes- Able to Participate  Level of consciousness: awake and alert  Post-procedure vital signs: reviewed and stable  Pain management: adequate  Airway patency: patent  PONV status at discharge: No PONV  Anesthetic complications: no      Cardiovascular status: blood pressure returned to baseline  Respiratory status: spontaneous ventilation, unassisted and room air  Hydration status: euvolemic  Follow-up not needed.        Visit Vitals  BP (!) 96/54 (BP Location: Right arm, Patient Position: Lying)   Pulse 72   Temp 36.6 °C (97.8 °F) (Oral)   Resp 18   Ht 5' 4" (1.626 m)   Wt 81.2 kg (179 lb)   LMP 2018   SpO2 98%   Breastfeeding? Yes   BMI 30.73 kg/m²       Pain/Abran Score: Pain Assessment Performed: Yes (10/29/2018  6:38 PM)  Presence of Pain: denies (10/29/2018  6:38 PM)  Pain Rating Prior to Med Admin: 5 (10/30/2018  9:06 AM)  Pain Rating Post Med Admin: 0 (10/30/2018  6:27 AM)        "

## 2018-10-30 NOTE — LACTATION NOTE
Webtalkhony pump, tubing, collections containers and labels brought to bedside.  Discussed proper pump setting of initiation phase.  Instructed on proper usage of pump and to adjust suction according to maximum comfort level.  Verified appropriate flange fit.  Educated on the frequency and duration of pumping in order to promote and maintain a full milk supply.  Hands on pumping technique reviewed.  Encouraged hand expression after pumping.  Instructed on cleaning of breast pump parts.  Written instructions also given.  Pt verbalized understanding and appropriate recall for proper milk handling, collection, labeling, storage and transportation.

## 2018-10-30 NOTE — LACTATION NOTE
LC able to hand express a few drops and give to baby on gloved finger. Mother aware that baby may need supplementation if baby's blood sugar continues to drop. Plan is to nurse on cue, pump and give EBM/ABM till content. LC left phone number on mother's white board for mother to call for asst as needed.Told mother what time LC leaves the floor. Mother also told that LC can see when she calls [employer] phone and if LC does not answer, she is busy but will come as soon as possible.

## 2018-10-30 NOTE — PROGRESS NOTES
POSTPARTUM PROGRESS NOTE     Maria Eugenia Briscoe is a 37 y.o. female POD #1 status post Repeat  section at 36w2d in a pregnancy complicated by AMA, h/o c/s, and h/o myomectomy. Patient is doing well this morning. She denies nausea, vomiting, fever or chills. Patient reports moderate abdominal pain that is well relieved by oral pain medications. Lochia is mild to moderate  and stable. Patient had Santiago catheter removed this morning and has not yet ambulated or spontaneously voided. She has passed flatus, and has not had BM. Patient does plan to breast feed. Will discuss contraception with Dr. Neumann. She desires circumcision.     Objective:       Temp:  [97.8 °F (36.6 °C)-98.9 °F (37.2 °C)] 97.8 °F (36.6 °C)  Pulse:  [59-91] 83  Resp:  [16-18] 18  SpO2:  [99 %-100 %] 100 %  BP: ()/(53-76) 112/58    General:   alert, appears stated age and cooperative   Lungs:   clear to auscultation bilaterally   Heart:   regular rate and rhythm, S1, S2 normal, no murmur, click, rub or gallop   Abdomen:  soft, non-tender; bowel sounds normal; no masses,  no organomegaly   Uterus:  firm located at the umblicus.    Incision: Bandage in place and is clean and dry   Extremities: peripheral pulses normal, no pedal edema, no clubbing or cyanosis     Lab Review  Recent Results (from the past 4 hour(s))   CBC auto differential    Collection Time: 10/30/18  4:20 AM   Result Value Ref Range    WBC 11.65 3.90 - 12.70 K/uL    RBC 3.19 (L) 4.00 - 5.40 M/uL    Hemoglobin 10.4 (L) 12.0 - 16.0 g/dL    Hematocrit 30.8 (L) 37.0 - 48.5 %    MCV 97 82 - 98 fL    MCH 32.6 (H) 27.0 - 31.0 pg    MCHC 33.8 32.0 - 36.0 g/dL    RDW 12.8 11.5 - 14.5 %    Platelets 184 150 - 350 K/uL    MPV 11.0 9.2 - 12.9 fL    Gran # (ANC) 9.9 (H) 1.8 - 7.7 K/uL    Lymph # 1.0 1.0 - 4.8 K/uL    Mono # 0.7 0.3 - 1.0 K/uL    Eos # 0.0 0.0 - 0.5 K/uL    Baso # 0.02 0.00 - 0.20 K/uL    Gran% 84.9 (H) 38.0 - 73.0 %    Lymph% 8.6 (L) 18.0 - 48.0 %    Mono% 5.8 4.0  - 15.0 %    Eosinophil% 0.2 0.0 - 8.0 %    Basophil% 0.2 0.0 - 1.9 %    Differential Method Automated        I/O    Intake/Output Summary (Last 24 hours) at 10/30/2018 0637  Last data filed at 10/30/2018 0445  Gross per 24 hour   Intake 300 ml   Output 2635 ml   Net -2335 ml        Assessment:     Patient Active Problem List   Diagnosis    History of myomectomy    S/P     Leakage of amniotic fluid        Plan:   1. Postpartum care s/p RLTCS:  - Patient doing well. Continue routine management and advances.  - Continue PO pain meds. Pain well controlled.  - Heme: H/h 12.3/36.6> 10.4/30.8  - Encourage ambulation  - Await spontaneous void today  - Circumcision desired, consents reviewed and signed  - Contraception per VG  - Lactation prn      Dispo: As patient meets milestones, will plan to discharge POD#3-4.    Catrina Hudson MD  OBGYN, PGY-1

## 2018-10-31 ENCOUNTER — RESEARCH ENCOUNTER (OUTPATIENT)
Dept: RESEARCH | Facility: HOSPITAL | Age: 38
End: 2018-10-31

## 2018-10-31 LAB — BACTERIA SPEC AEROBE CULT: NORMAL

## 2018-10-31 PROCEDURE — 11000001 HC ACUTE MED/SURG PRIVATE ROOM

## 2018-10-31 PROCEDURE — 25000003 PHARM REV CODE 250: Performed by: STUDENT IN AN ORGANIZED HEALTH CARE EDUCATION/TRAINING PROGRAM

## 2018-10-31 RX ADMIN — OXYCODONE HYDROCHLORIDE AND ACETAMINOPHEN 1 TABLET: 5; 325 TABLET ORAL at 02:10

## 2018-10-31 RX ADMIN — DOCUSATE SODIUM 200 MG: 100 CAPSULE, LIQUID FILLED ORAL at 09:10

## 2018-10-31 RX ADMIN — IBUPROFEN 600 MG: 600 TABLET ORAL at 12:10

## 2018-10-31 RX ADMIN — IBUPROFEN 600 MG: 600 TABLET ORAL at 05:10

## 2018-10-31 RX ADMIN — IBUPROFEN 600 MG: 600 TABLET ORAL at 02:10

## 2018-10-31 RX ADMIN — OXYCODONE HYDROCHLORIDE AND ACETAMINOPHEN 1 TABLET: 5; 325 TABLET ORAL at 06:10

## 2018-10-31 RX ADMIN — OXYCODONE HYDROCHLORIDE AND ACETAMINOPHEN 1 TABLET: 10; 325 TABLET ORAL at 02:10

## 2018-10-31 RX ADMIN — OXYCODONE HYDROCHLORIDE AND ACETAMINOPHEN 1 TABLET: 5; 325 TABLET ORAL at 09:10

## 2018-10-31 NOTE — PROGRESS NOTES
[address] [place] [address]   Respiratory Therapy  Patient Assessment and Treatment Plan    TIME IN: 0800     TIME OUT:  4068       DEMOGRAPHIC & RELATED DATA Patient Name: Gerson DUVAL  1961 Age  64 y.o.  Social Security #  /Medical Record #  [id number]  [id number]   Sex   Marital Status     Emergency Contact Name  Extended Emergency Contact Information  Primary Emergency Contact: [address] Phone: [phone]  Relation: Child Phone (Area Code) Number     Emergency Contact Address     REFERRAL Source Name:  Freddy Delgadillo MD  [facility] [address] [place] [address] [facility]  [place] [address] Phone           Number   Referring Diagnosis     Chief Complaint     PHYSICIANS PCP Name:    Joselyn Pinto MD Phone      (Area Code)      Number       Address:   [facility]         (Area Code)      Number       Pulmonologist Name:   Phone      (Area Code)      Number       Address: FAX         (Area Code)      Number       [address] If Yes, Agency Name  No  Phone      (Area Code)      Number       []- Yes    [x]- No Address FAX         (Area Code)      Number       PATIENT REPORTING OF MEDICAL HISTORY Check All that Apply   Sleep Symptoms Daily Symptoms Additional Medical Disorders     [] Within normal limits   [] Nocturnal                  Hypoxemia     [x] Obstructive Sleep           Apnea     [x] Increased sleep   [] Decreased sleep   [] Night Sweats   [] Insomnia   [x]Frequent Urination   [] Muscle Spasms    [x] Daytime Sleepiness   [x] Shortness of                    breath   [] Nightmares   [x] Leg Jerking   [x] Snoring   [] Sleeps in chair/sofa       due to shortness of      breath     [x] Coughing   [] Heart Palpitations   [x] Feather Pillows               used    [x] Excessive                     worry   [x] Nervousness   [x] Panic episodes   [x] Depressed   [x] Cough   [x] Cough with                    Sputum     [x] Shortness of TXA Inpatient Follow Up     Pt reported no nausea, vomiting, diarrhea,, dizziness, urticaria/dermatitis, blurred or other changes in vision.  PP 24h CBC collected 10/30/2018 at 04:20. Hb 10.4 Hct 30.8 Plt 184. Repeat CBC 24-48 not collected.      Infant is feeding mother's breastmilk only.      Gave pt gift card and will follow up by phone next week.    breath at rest     [x] Wheezing     [] Chest Pain  [x] Shortness of        breath with mild exertion     [x] Shortness       of breath with moderate        exertion     [x] Weakness       /Fatigue   [x] Lacking       endurance   [] Presence of         barrel chest   [] Utilizes                    shoulders,      chest & neck to aid in breathing     [x]  Dizziness     [x] Ankle swelling     [x] Hoarseness    [x] Hypoxemia   [x] Diabetes   [x]Neuropathy   []Hypertension   [x] Osteoarthritis   [] Osteoporosis   [] Rheumatoid Arthritis     [] Fibromyalgia   [] Scoliosis  []Coronary Artery Disease   [] Atrial Fibrillation []Abnormal Posture   [x] Debility   [] CHF   [] Family history of COPD     [] Other:     RESPIRATORY HOME CARE EQUIPMENT   Use of the following:      []- Peak Flow Meter     [x]- Aerosol machine         []- Suction machine     []- Ventilator    []- Mechanical chest percussor    []- PEP valve   []- Oxygen:   Flow Rate:    []- None                                                                                       System:        See Summary List for additional Medical and Surgical History, Medications, and Allergies     PULMONARY HISTORY   Cough present: [x]- Yes     []- No    []- AM     []- PM    []- Nighttime     [x]- Around the clock       Mucus:  Color and amount:           []- Thick    [x]- Thin   []- Moderate        When:  []- AM    []- PM   [x]- Around the clock    Mucus raised and/or alleviated by:cough    How often do you see your pulmonologist:    []- Every month    [x]- Every quarter  []- Every six months  []- Other:     Typically how often do you have lung infections:never    Number of times youve been hospitalized due to pulmonary condition (in last year): 0    Number of times youve visited the E.D. due to pulmonary conditions (in last year):  0    Shortness of breath is alleviated by: resting, using MDI    VACCINATIONS []   Flu (pt refuses)   []  Pneumonia (pt refuses)    [] Tetanus (pt refuses)       IF NOT, Staff recommendation:    There is no immunization history on file for this patient. OCCUPATIONAL / ENVIRONMENTAL HISTORY Retired? [] - YES            [x] No      Type of work:   Disabled   Occupational exposure: [x] None [] Welding            [] Asbestos      [] Mines/foundry         [] [employer]      [] Other   Residence  Type [x]     House                   []     Apartment                   []     [address]                []     [address]       []     Residential Living                  []     Other: # Occupants 3 Relationship with Residents:   Child and brother        # Levels:  1   # Stairs between levels:  0  # Stairs to enter:  4            Household pets:    [x]   Yes   []   No    If so, type: one dog, two cats     Comments:    EDUCATIONAL HISTORY Highest Grade Achieved: thGthrthathdtheth:th th1th1th College Degree(s) Obtained / Major:    Orientation:      [x]- Time       [x]- Place       [x]- Person       [x]- Situation     If disoriented in any sphere, describe:  Prefers to Learn By:    []- Audio  []- Visual []- Demonstration             []- Printed Material    [x]- No Preference   Barriers to Learning:   [x]- None         []- Language     []- Vision      []- Hearing    []- Emotional    []- Cognitive         Other (Describe):     ETHNIC / CULTURAL ISSUES Primary Language: [x]- English     Other - Specify:      Any Restoration, Ethnic or Cultural Practices/ Beliefs that May Influence Treatment?        []- Yes     [x]- No     If Yes, Describe:     HEARING/VISION/MOBILITY/ADLS Prosthesis:   []- None   Describe:   []- Hearing Impaired   ð Hearing Aids:   []- None     []-Bilaterally     []- Right     []- Left   [x]- Visually Impaired  ð  []- Legally Blind       [x]- Glasses       []- Contact Lenses   Ambulation:   []- By Self     [x]- Ambulates with Assistance ð  [x]- Cane    []- Crutches     []- [place] Cruzing                             []- Wheelchair        []- Unable to Ambulate   Adaptive Equipment:     []- None      [x]- Shower Seat       [x]- Grab Bars       []- Reachers       []-Non-Slip Floor Mat   Comments:    PAIN ASSESSMENT  Numeric Pain Scale 1-10 Is pain present or has it been present within the past 6 months? yes If No, May Omit Remainder of Pain Assessment     Chest Pain:  []- Yes     [x]- No    If yes, rating:    Level at which pain is unacceptable:   Freq/Duration:   Aggravated by: Alleviated by: Other Pain Location and Rating:  []- Fingers ____    []- Hand ____   []- Wrist ___  []- Elbow __   [x]- Shoulder _1___ []- Neck ____ [x]- Back _4___[]- Hip ____ [x]- Leg _2___   [x]- Knee _1___   [x]- Ankle ____ [x]- Foot _1___ []- Toes ____  []- Other _____    Level at which pain is unacceptable:3         Frequency/Duration: daily    Aggravated by:       Walking, standing     Alleviated by: sitting      INSPIRATORY MUSCLE ASSESSMENT   Negative Inspiratory Force Test Results:   cwp* (< -60 cwp indicates IMT need)    []- Not applicable                [x]- Not available       FALL RISK ASSESSMENT If 3 or more are checked, patient requires Fall Precautions   [x] -  History of Fall(s) within last 3 months   [] -  Altered Mental Status - altered level of consciousness, confused/disoriented, unable to understand/remember, cognitive impairment, or alcohol use   [x] -  Altered Mobility - unsteady gait, requires use of assistive device (cane, wheelchair, walker), or requires staff assistance   [] -  Predisposing Diseases/Diagnosis - sensory impairments, neurological disorder, hypotension, orthostatic hypotension, vertigo, seizures, arthritis/osteoporosis, or age less than 3 years or greater than 65 years   [] -  Environment - IV, ECG leads, O2, [place]   [x] -  Medications - does patient take 4 or more home medications     FUNCTIONAL ACTIVITIES OF DAILY LIVING  Check All Deficits related to shortness of breath, fatigue, pain and/or emotional or psychological elements   Functional Mobility Bathing/Grooming/Dressing Household activities Meal/yard maintenance   [] Bed transfers  [] Chair transfers  [] Toilet transfers  [] Automobile transfers  [] Tub/shower transfers  [x] Ambulation on level ground  [x] Ambulation on slight incline  [x] Stairs  [x]  Carrying objects  [x]  Grocery shopping  [x]  Walking to [place] [x]  Bathing / Showering  [x]  Washing hair  [x]  Washing / Drying upper body  [x]  Washing / Drying lower body  [x]  Combing hair  [x]  Brushing teeth  [] Make-up application  []  Shaving  []  Upper body dressing  []  Lower body dressing  []  Donning / [place] socks and/or shoes [x] Vacuuming  [x] Mopping  [x] Sweeping  [x] Garbage removal  [x] Making bed  [x] Changing sheets  [x] Placing / Retrieving clothes in /       out washer    [x] Placing / Retrieving clothes in /       out dryer    [] Ironing  [x] Sorting / Folding clothes  [x] Hanging clothes [x] Light meal preparation  [x] Cooking  [] Eating  [x] Clean-up  [x] Washing dishes  [] Loading / unloading     [x] Pulling weeds  [x] Planting flowers  [x] Gardening  [x] Raking leaves  [x] Mowing grass   [x] Painting   PATIENT REPORT OF PRIOR FUNCTIONAL LEVEL  In patients words, what could he/she do before breathing problems began? Elda Esters limited for many years due to back pain; not just breathing related. PATIENT REPORT OF CHANGE(S) IN FUNCTIONAL LEVEL In patients words, what has changed since breathing problems began? I am even more limited now because of not being able to breathe. TRANSPORTATION CAPABILITY   [x]- Able to provide own transportation. [x]- Patient drives    []- Lack of reliable transportation. []- Medically unable to drive. [x]- Has a handicapped sticker    []- Patient has someone to drive them if needed    []- Financial constraints prohibit ability to provide own  transport. Determination of Need for Transportation (Check all that apply)       TOBACCO USE Current   Use?  Age Began Route Usual Daily Amount / Frequency Date Last Used    no n/a  History   Smoking Status    Never Smoker   Smokeless Tobacco    Never Used       Are you willing to participate in a Smoking Cessation Program? Not applicable NOTE:  Patients who currently smoke & are unwilling to stop tobacco use and/or participate in a smoking cessation program could be ineligible for services. Comments:      EDUCATIONAL NEEDS Check All that Apply   COPD & LUNG DISEASE Lacks understanding of components of disease no    Lacks understanding of disease and disease progression no    Lacks understanding of environmental changes affecting lung function (temperature, humidity, wind, allergies, etc.) no   BREATHING RE-TRAINING Lacks independent Pursed Lip Breathing (PLB)? yes    Lacks independent Diaphragmatic Breathing (DB)? yes    Megan Rate of Perceived Exertion (RPE) Scale of > ten (10) while resting? no    Exhibits abnormal respiratory rate? no    Rests arms on table to recruit accessory muscles? no   COLLABORATIVE SELF-MANAGEMENT   (Signs & symptoms of Infections & Peak Flow Meters) Exhibits > one (1) lung infections(s) in the past year? no    Lacks evidence of self-monitoring signs for infections? yes   MEDICATIONS Non-compliant with medications? no    Exhibits knowledge deficit related to side effects of medication? yes    Exhibits knowledge deficit related to use and purpose of medications? no    Prescribed supplemental Oxygen                            []- N/A       No    Exhibits knowledge deficit related to oxygen use? []- N/A     Not applicable    Unable to demonstrate correct usage of Metered Dose Inhaler (if indicated)? []- N/A   Not applicable    Unable to demonstrate correct usage of spacer (if indicated)?                                                                 []- N/A   Yes   TRAVEL & ENVIRONMENT Exhibits allergic reaction to allergens (pets, plants, dust, mold, etc.)? yes    Travel restricted due to shortness of breath? No   BRONCHOPULMONARY HYGIENE /   SECRETION CLEARANCE & MANAGEMENT Complains of sputum/mucus production? yes    Diagnosed as having chronic bronchitis? no    Diagnosed as having bronchiectasis? no    Demonstrates poor cough/banks techniques? no    Complains of loose non-productive cough? no    Patient uses chest physiotherapy (CPT)? no    Patient uses high frequency chest wall oscillation (HFCWO)?  no    Exhibits knowledge deficit related to proper fluid intake? no   SEXUALITY &   LUNG DISEASE Patient desires information on sexuality with lung disease? no   STRESS MANAGEMENT/  EMOTIONAL HEALTH Patient exhibits or reports increased levels of stress? no    Patient does not practice relaxation techniques? no     PSYCHOSOCIAL ASSESSMENT Mental and Emotional functioning impacted by deficits, limitations, and/or symptoms associated with the patients pulmonary disease          Attitude                   [x] - Cooperative          [] - Guarded          [] - Uncooperative          [] - Hostile        [] - Apathetic       [] - Defensive            Self-concept             [x] - Intact         [] - Helpless          [] - Hopeless          [] - Grandiose        [] - Self-deprecating       [] - Depersonalized            Consciousness         [x] - Alert          [] - Hyper-alert      [] - Lethargic          [] - Clouded           [] - Unresponsive       [] - Lacks focus/attention            Mood       [x] - Stable               [] - Depressed            [] - Anxious              [] - Irritable              [] - Angry                             [] - Calm                 [] - Euphoric               [] - Guilty                  [] - Grieving                   Areas of function acutely / negatively affected by patients pulmonary disease:      [] - Marital/Intimacy/Family         [] - Financial        [] - Safety     [] - Spiritual              [] - Sexual      [x] - Activities of Daily Living            [] - Vocational   [x] - Social             [] - Recreation/Leisure        Aspects of family and home situation that may affect treatment:       Negatively  [] - Lacks support system      [] - Financial      [] - Legal problems    [] - Experiences Isolation                           [] - Dependent care    [] - / spouse/significant other    [x] - Other health issues     Positively     [] - Spiritual  [x] - [place], caring, support system   [] - Living spouse or significant other   [] - Socially included                 Evidence of abuse/neglect:     [] - Verbal       [] -Physical     [] - Emotional    [] - Sexual      [] - Substance       [] - Addictive      [] - Financial        Does the patient report any of the following? [x] - Depression           [x] - Anxiety     [x] - Panic         [] - Poor Coping       How much stress does the patient report? [] - None                    [] - Low         [x] - Moderate            [] - High         Please see attached [facility] COOP (Quality of Life) and PHQ-9 (Depression Tool). Exercise (REQUIRED)    Initial Assessment    RT Az  2017,8:24 AM Initial Plan    Goals/Interventions/Education  (Exercise Prescription) 30-Day Re-Assessment/POC    Date/Time: 17 1023    Initials:  LC  60-Day Re-Assessment/POC    Date/Time:  Initials:      Tool - Six-minute walk test    Initial Walk:  Distance  211 ft  Pre- Vitals:  HR:72        O2 Sat:  98  BP: 122/78      L of 02:0  RPE: 11      RPD: 2       RPP:7    During Vitals:  HR:  94      O2 Sat:  98  BP:  128/82     L of 02:0  RPE: 15      RPD:  3      RPP:8    Post Vitals:  HR:    67    O2 Sat:  94  BP:  120/72     L of 02:0  RPE:       RPD:        RPP:  Speed:  0  Met Level:  0    Assistive Device:    [x]  Yes   []  No            Rest Breaks:  [x]  Yes  []  No  If yes, amount of rest time:    1:30        Pain:     [x]  Yes   []  No     Comments: Back pain     [x]  Appropriate  BP / HR / O2 response to exercise    Discharge Walk:  Distance:  Pre- Vitals:  HR:        O2 Sat:    BP:       L of 02:  RPE:       RPD:        RPP:    During Vitals:  HR:        O2 Sat:    BP:       L of 02:  RPE:       RPD:        RPP[de-identified]      Post Vitals:  HR:        O2 Sat:    BP:       L of 02:  RPE:       RPD:        RPP:  Speed:   Met Level:    Assistive Device:   []  Yes []  No            Rest Breaks:   []  Yes  [] No  If yes, amount of rest time:            Pain:   [] Yes   []  No          Comments:     Date/Time:  Initials:    Short-term goals (to be achieved in 4 weeks):  [x] Compliant with Exercise Prescription > 80% of time   [x] Participation in identified educational sessions  Other:        Exercise Pres./Interventions  Aerobic Mode:   [x] Treadmill  [x] Bike  [x] NuStep  [x] Arm Ergometer  []  Other:     Frequency: 2-3 days/week  Duration: Up to 30 min per mode  Intensity:  RPE 6   to  15    RPD: < 2      Strength training Mode:  [x]  Free Weights/Thera-bands  Frequency: 2-3 days/week  Duration: Up to 30   minutes   Intensity: 1-2 sets; 12-15 reps;  RPE: 6 to 15         Breathing Exercise Mode:  [x] IMT/PEP  [x] Weight Diaphragmatic Breath  [x] Incentive Spirometer  Frequency: 2-3 days/week  Duration: Up to 30 min per mode  Intensity:RPE: 6-12      Educational Topics (see education log):  [x]  RPD/RPE/Pain Scale  [x]  Exercise: Safety & Monitoring  []  Home Exercise Plan  [x]  Collaborative Self-Management  []  Other:     LTG (to be achieved by D/C):  [x] Exercise > 100___ min with O2 Sat >  89     , RPD <  2      ,  [x] Increase 6MW test by   50  Feet. *Please see recent flow sheets  STG Status:  Compliant with Exercise Prescription. [x] Yes   [] No      If no, describe:      Participation in educational sessions.  [] Yes   [x] No          If no, describe:       Updated Exercise Prescription   Aerobic Mode:   [x]  Treadmill  [x] Bike  [x] NuStep  [x]  Arm Ergometer  [] Other:   Frequency: 2-3 days/week  Duration: Up to 30   min per mode  Intensity:  RPE  6  to 15     RPD: <  2      Strength training Mode:  [x]  Free Weights/Thera-bands  Frequency: 2-3 days/week  Duration: Up to 30 minutes   Intensity: 1-2 sets; 12-15 reps;  RPE:   6  to   15        Breathing Exercise Mode:  [x] IMT/PEP  [x] Weight Diaphragmatic Breath  [x] Incentive Spirometer  Frequency: 2-3 days/week  Duration: Up to  30   min per mode  Intensity:RPE:6-12                            LTG (to be achieved by D/C):  [x] Exercise > 100 min/week with O2 Sat >   89    , RPD <    2    ,  [x] Increase 6MW test by 50    Feet. *Please see recent flow sheets  STG Status:  Compliant with Exercise Prescription. [] Yes   []No          If no, describe:     Participation in educational sessions   [] Yes   [] No          If no, describe:      Updated Exercise Prescription  Aerobic Mode:   []  Treadmill  []  Bike  []  NuStep  []  Arm Ergometer  []  Other:   Frequency: 2-3 days/week  Duration: Up to    min per mode  Intensity:  RPE    to      RPD: <              Strength training Mode:  []  Free Weights/Thera-bands  Frequency:2-3 days/week  Duration: Up to    minutes   Intensity: 1-2 sets; 12-15 reps;  RPE:     to                 Breathing Exercise Mode:  [] IMT/PEP  [] Weight Diaphragmatic Breath  [] Incentive Spirometer  Frequency: 2-3 days/week  Duration: Up to    min per mode  Intensity:RPE:  y.                                    LTG (to be achieved by D/C):  [] Exercise > ___ min with O2 Sat >       , RPD <        ,  [] Increase 6MW test by     Feet. Nutrition (REQUIRED)           Initial Assessment   Marylee Loges, RT  11/17/2017,8:24 AM Initial Plan    Goals/Intervention/Education  30 Day Re-assessment/POC    Date/Time: 12/7/17 1023  Initial:LC 60 Day Re-assessment/POC    Date/Time:    Initial:         Tool - Rate Your Plate    Pre -RYP Score: 30  (Healthy Diet >57)    Special Diet?    []  Yes  [x]  No          If yes, describe:       Caffeine Intake? [x]  Yes   []  No          If yes, amount: 1 soda daily              Alcohol Intake? []  Yes   [x] No          If yes, amount:      Current Weight:  276  Current Height: 66 in  Current BMI: 40.8    To calculate BMI:          Weight (pounds)           Height (inches)2 x 703 =               <18.5 = Underweight      18.5-24.9 = Normal       25-29.9 = Overweight     >30 = Obese   Short-Term Goals (to be achieved in 4 weeks):  []  Compliant with            prescribed, specialized diet. [x]  Participate in nutritional       health classes    []  Improve RYP score to:  (if checked, RYP must be reassessed at 30 days)    Other:     Interventions:  [x]  Review Eating Habits  []  Review Nutritional Screening  [x]  Encourage Healthy Diet  []  Recommended or requested    1:1 Dietary Consult    Educational Topics (see educational log):  [x]  Nutritional Health Benefits    LTG (to be achieved by D/C):     [x] Patient will verbalize an understanding of a life-long adherence to a healthy diet and the impact on health condition(s). *Please see recent flow sheets  STG Status:  Compliant with prescribed, specialized diet      % of the time per patient report. Participate in nutritional health classes   [x]  Yes   []  No          If no, describe:      Current RYP score: Other: Describe:    Dietician Consult Completed       []  Yes   [x]  No   []  N/A    UPOC:  Pt will continue to:   []  Be compliant with prescribed, specialized diet    [x] Participate in nutritional     health classes. [] Improve RYP score to:  (RYP needs to be reassessed if still active STG)    LTG (to be achieved by D/C):   [x] Patient will verbalize an understanding of a life-long adherence to a healthy diet and the impact on health condition(s). *Please see recent flow sheets  STG Status:  Compliant with prescribed, specialized diet    % of the time per patient report.         Participate in nutritional health classes. [] Yes [] No          If no, describe:      Current RYP score: Other:      Dietician Consult Completed        [] Yes   [] No    []N/A    UPOC:  Pt will continue to:   []  Be compliant with prescribed, specialized diet. []  Participate in nutritional health classes. [] Improve RYP score to:  (RYP needs to be reassessed if still active STG)    LTG (to be achieved by D/C):     [] Patient will verbalize an understanding of a life-long adherence to a healthy diet and the impact on health condition(s). Psychosocial (REQUIRED)  Initial Assessment    Omar Bland, RT  11/17/2017,8:24 AM  Initial Plan    Goals/Intervention/Education 30-Day Re-Assessment/POC    Date/Time: 12/7/17 1023  Initials: LC 60-Day Re-Assessment/POC    Date/Time:  Initials: Tool (Quality of Life) - [facility]*     *Please see attached. Tool (Depression) PHQ 9  Score: 11  *If score is > 5, PHQ 9 should be reassessed every 30 days. [x]  Poor Coping skills    [x]  Currently on psychotropic medication    Average Sleep Hours: 9-10    [x]  Abnormal Sleep Patterns (Sleep Apnea; Snoring)    []  Currently Seeing Counselor/Physician    Positive Support System? [x]  Yes   []  No           []  Recommend additional behavioral health assessment    Currently smoking? []  Yes   [x]  No                 Short Term Goals (to be achieved in 4 weeks):  [x]  Manage and reduce stress per patient report. [x]  Improve PHQ 9 score to:  8    []  Increase average sleep hours by:     [x]  Participate in emotional health class. []  Decrease smoking by 50%.     [x]  Other: Develop normal sleeping patterns    Interventions:  [x] Re-assess PHQ-9 every 30-Day: Review if  > 5    [x]  Provide simple relaxation techniques practiced daily    []  Refer behavorial health psychotherapy needs to Physician/licensed psychotherapist.    Educational Topics (see education log):    [x]  Emotional Health   [x] Importance of adequate sleep  []  Smoking cessation  LTG (to be achieved by D/C):  [x]  Use relaxation techniques when stressors are identified and verbalize coping skills and decrease vulnerability to stress. []  Maintain decreased smoking behaviors/smoking cessation. [x]  Improve PHQ-9 scores   [x] Improve [employer] scores and maintain a health psychosocial well-being. *Please see recent flow sheets    STG Status:  Manage and reduce stress per patient report. [x]  Yes   []  No          If no, describe:      PHQ 9 current score is: patient not available. Will document score on flowsheet at patient's next visit. Average sleep hours:9-10     Participate in emotional health class. [x]  Yes   []  No          If no, describe:        Decreased smoking by:     %    UPOC:  Pt will continue to:   [x]  Manage and reduce stress per patient report. []  Improve PHQ 9 score to:     []  Manage sleep as evidenced by average sleep hours of:        [x] Participate in emotional health class. []  Decrease smoking by    %    []  Other:      LTG (to be achieved by D/C):  [x]  Use relaxation techniques when stressors are identified and verbalize coping skills and decrease vulnerability to stress. []  Maintain decreased smoking behaviors/smoking cessation. [x]  Improve PHQ-9 scores   [x] Improve [employer] scores and maintain a health psychosocial well-being. *Please see recent flow sheets    STG Status:  Manage and reduce stress per patient report. []Yes    [] No          If no, describe:     PHQ 9 current score is:      Average sleep hours:      Participate in emotional health class. []  Yes []  No          If no, describe:       Decreased smoking by:  %    UPOC:  Pt will continue to:   []  Manage and reduce stress per patient report. [] Improve PHQ 9 score to:   :   []  Manage sleep as evidenced by average sleep hours of:      []  Participate in emotional health class.      []  Decrease smoking by %    Other:          LTG (to be achieved by D/C):  []  Use relaxation techniques when stressors are identified and verbalize coping skills and decrease vulnerability to stress. []  Maintain decreased smoking behaviors/smoking cessation. []  Improve PHQ-9 scores   [] Improve [facility] scores and maintain a health psychosocial well-being. Oxygen (REQUIRED)    Initial Assessment  RT Tran  11/17/2017,8:24 AM Initial Plan    Goals/Interventions/Education 30-Day Re-Assessment/POC    Date/Time: 12/7/17 1023  Initials:LC  60-Day Re-Assessment/POC    Date/Time:  Initials:      Evidence of pulmonary/respiratory diagnosis and/or associated symptoms? [x]  Yes   []  No   Patient is prescribed Oxygen? []  Yes   [x]  No          If yes, current prescription? Compliant with current prescription? []  Yes   [] No   []  N/A            Understands specifics of Oxygen prescription? []  Yes  []  No   []  N/A            Understands all safety protocols regarding Oxygen usage? []  Yes  []  No    [] N/A              Evidence of de-saturation with activity (six-minute walk test)       []  Yes   []  No              Short-Term Goals (to be achieved in 4 weeks):  []  Compliant with Oxygen prescription. [x]  Maintain Oxygen saturations >89   at rest.    [x]   Maintain Oxygen saturations >89  with exertion. []   Utilize  PLB  20  % of the time without prompting. [x]   Utilize DB 15 % of the time without prompting.     []   Other:      Interventions:    [x]   Discuss and review pursed-lip breathing       [x]   Discuss and review diaphragmatic breathing    [x]   Monitor oxygen saturations throughout prescribed treatment    [x]   Maintain patient's oxygen saturation     89   To  100  % during exercise and titrate 1-2 L/M until consistently at     %    Education Provided (see educational log):    []   Oxygen importance/compliance   []  Oxygen safety   []  Oxygen and Travel  []  Other:    LTG (to be achieved by D/C):    [x] Patient will independently perform pursed-lip breathing  [x] Patient will independently perform diaphragmatic breathing  [] Patient will follow Oxygen prescription *Please see recent flow sheets  Changes in Prescription:   []  Yes  []  No    [x] N/A    If yes, describe:    STG Status:  []  Compliant with Oxygen prescription. [x]  Maintain oxygen saturations >   89  at rest.     [x]   Maintain Oxygen saturations >   89   with exertion. [x]  Utilize  PLB   15  % of the time without prompting. [x]  Utilize DB 10    % of the time without prompting. [] Other: Describe:        UPOC:  Pt will continue to:   [] Be compliant with Oxygen prescription. [x]  Maintain Oxygen saturations >   89    at rest.  [x]  Maintain Oxygen saturations >  89    with exertion. [x] Utilize PLB  20   % of the time without prompting. [x]  Utilize DB 15   % of the time without prompting. []  Other:     LTG (to be achieved by D/C):    [x] Patient will independently perform pursed-lip breathing  [x] Patient will independently perform diaphragmatic breathing  [] Patient will follow Oxygen prescription *Please see recent flow sheets  Changes in Prescription:   []  Yes  []  No    [] N/A    If yes, describe:    STG Status:  []  Compliant with Oxygen prescription. []  Maintain Oxygen saturations >   at rest.     []  Maintain Oxygen saturations >  with exertion. []  Utilize PLB        % of the time without prompting. [] Utilize DB      % of the time without prompting. UPOC:  Pt will continue to:   [] Be compliant with Oxygen prescription. []  Maintain Oxygen saturations >       at rest.  []  Maintain Oxygen saturations >      with exertion. [] Utilize PLB     % of the time without prompting. []  Utilize DB    % of the time without prompting.   []  Other:    LTG (to be achieved by D/C):    [] Patient will independently perform pursed-lip breathing  [] Patient will independently perform diaphragmatic breathing  [] Patient will follow Oxygen prescription       Core Measures Congestive Heart Failure    Initial Assessment    Jed Gil, RT  11/17/2017,8:24 AM Initial Plan    Interventions/Education/Goals 30-Day Re-Assessment/POC    Date/Time:12/7/17 1023  Initials: LC  60-Day Re-Assessment/POC    Date/Time:   Initials:      [x]  Not a Risk Factor at This Time. No plan of care required. []  Risk Factor:        []  Current Dx of CHF        []  Hx of CHF    []  EF:    Date:   Source:   NYHA Symptom Class:          []   II          []   III          []  IV    Prescribed CHF Medications? []  Yes  []  No            Compliant with CHF Meds? []  Yes []   No  []  N/A     Has Scale at Home?        []  Yes   []  No     Weighs Daily? []  Yes   []  No                Knows Baselines (normal weight, B/P, HR, amount of activity tolerated before getting tired, etc.)? []  Yes   []  No      Knows signs/symptoms of worsening CHF (wt gain, SOB, swelling, fatigue, etc.)? []  Yes  []  No      Sleeps with propped up pillows? []  Yes  []  No     If yes, how many?       Short Term Goals (to be achieved in 4 weeks):  []  Identification of individual Baselines  (see left)  []  Understanding of & able to act upon signs/symptoms of worsening CHF   []  Monitors weight daily at home  []  Monitors BP at home  []  Compliant with medications  []  Compliant w/ diet and sodium intake  []  Monitors home fluid intake  []  Other:      Interventions    []  Self-Management Care    []  Provides CHF educational class(es)    [] Other:    Education Provided (see educational log)    []  Living with CHF     LTG (to be achieved by D/C):  [] Able to verbalize individual baselines, signs and symptoms of worsening mays failure and appropriate self-management   [] Compliant with sodium intake and fluid intake  [] Compliant with medications  [] Weighs self daily  [] Monitors blood pressure at home *Please see recent flow sheets   Newly diagnosed:   []  Yes  []  No    [x] N/A       STG Status/UPOC:  Per patient report: Identifies own individual Baselines  (see left):  []  Yes  []  No, continue      Understands and able to act upon symptoms of worsening CHF:   []  Yes   []  No, continue      Weighs self daily:   [] Yes   []  No, continue      Monitors BP at home:   [] Yes   []  No, continue      Compliant with medications:   [] Yes   []  No, continue      Compliant w/ diet and sodium intake:   [] Yes   []  No, continue      Monitors home fluid intake:   [] Yes   []  No, continue      Exercises daily:  [] Yes   []  No, continue      [] Other:   [] Yes   []  No, continue                                         LTG (to be achieved by D/C):  [] Able to verbalize individual baselines, signs and symptoms of worsening mays failure and appropriate self-management   [] Compliant with sodium intake and fluid intake  [] Compliant with medications  [] Weighs self daily  [] Monitors blood pressure at home   *Please see recent flow sheets  Newly diagnosed:   []  Yes  []  No    [] N/A      STG Status/UPOC:  Per patient report:   Identifies own individual Baselines  (see left):  []  Yes  []  No, continue      Understands and able to act upon symptoms of worsening CHF:   []  Yes   []  No, continue      Weighs self daily:   [] Yes   []  No, continue      Monitors BP at home:   [] Yes   []  No, continue      Compliant with medications:   [] Yes   []  No, continue      Compliant w/ diet and sodium intake:   [] Yes   []  No, continue      Monitors home fluid intake:   [] Yes   []  No, continue      Exercises daily:  [] Yes   []  No, continue      [] Other:   [] Yes   []  No, continue     LTG (to be achieved by D/C):  [] Able to verbalize individual baselines, signs and symptoms of worsening mays failure and appropriate self-management   [] Compliant with sodium intake and fluid intake  [] Compliant with medications  [] Weighs self daily  [] Monitors blood pressure at home         Core Measures Hypertention  Initial Assessment    Kaylee Smith,   11/17/2017,8:24 AM Initial Plan     Goals/Interventions/Education 30-Day Re-Assessment/POC    Date/Time:12/7/17 1023  Initials:LC 60-Day Re-Assessment/POC    Date/Time:  Initials:     [x]  Not a Risk Factor at This Time. No plan of care required.      []  Hypertension Risk Factor    []  On Blood Pressure Meds  []  Medication Regimen Compliance    []  Yes  []  No   []  Monitors BP at Home                    []  Yes   []  No  []  No B/P monitoring system at home  []  If No B/P monitoring system at Home, Encouraged to Purchase  Target BP: (if different from standing order)            * Resting blood pressure obtained prior to six-minute walk test. Short Term Goals (to be achieved in 4 weeks):  []  BP Medication compliance  []  Low Na+ diet (DASH)  []  Monitors BP at home  Resting BP :   Other:   Interventions  []  Monitor BP before and after exercise  Education Topics (see education log):  []  Living with Hypertension     LTG (to be achieved by D/C):  [] Consistent resting blood pressure <  [] Monitors BP at home  [] BP medication compliance     *Please see recent flow sheets  Newly diagnosed:   []  Yes  []  No    [x] N/A     If yes, describe date of onset and medication changes:    STG Status/UPOC:  Per patient report    BP Medication compliance:  [] Yes   []  No, continue    Low Na+ diet:  [] Yes   []  No, continue    Monitors BP at home:  [] Yes   []  No, continue    Resting BP (obtained by clinician):  [] Yes   []  No, continue    Other:  *Please see recent flow sheets  Newly diagnosed:   []  Yes  []  No    [] N/A     If yes, describe date of onset and medication changes:    STG Status/UPOC:  Per patient report    BP Medication compliance:   [] Yes   []  No, continue    Low Na+ diet:  [] Yes   []  No, continue    Monitors BP at home:  [] Yes   []  No, continue    Resting BP (obtained by clinician):  [] Yes   []  No, continue    Other:          LTG (to be achieved by D/C):  [] Consistent resting blood pressure <  [] Monitors BP at home  [] BP medication compliance   LTG (to be achieved by D/C):  [] Consistent resting blood pressure <  [] Monitors BP at home  [] BP medication compliance       Core Measures Diabetes    Initial Assessment    Neville Fernandez, RT  11/17/2017,8:24 AM Initial Plan      Goals/Interventions/Education 30-Day Re-Assessment/POC    Date/Time:12/7/17 1023  Initials:LC 60-Day Re-Assessment/POC    Date/Time  Initials:    []  Not a Risk Factor at This Time. No plan of care required. []  Diabetes Risk Factor    []  Type I  [x]  Type II      [x]  Insulin Dependent  []  Insulin Pump? []  Oral Hyperglycemic            Medications  []  Diet Controlled  []  Newly Diagnosed      Patient Owns a Glucometer             [x] Yes   []  No      [] Patient monitors BS @ home          If yes, frequency/day    BS Range at home:     How Long a Diabetic? 4-5 years  Pt compliant With Meds                   [x]  Yes     []  No      Pt Compliant With Diabetic Diet                 []  Yes       [x]  No      HbA1c    (Desired <7%)    Date:    [x] HbA1c Unavailable Short Term Goals (to be achieved in 4 weeks):  [x] Compliant with diabetic medication  [x] Compliant with diabetic diet  [x] Monitors blood sugar at home  [x] Takes appropriate corrective actions when blood glucose is out of range   [] Other:   Interventions  [x]  Review patient's reported blood sugar pre and post exercise, as needed based on signs/symptoms.   Education Topics(see education log):  [x] Living with Diabetes     LTG (to be achieved by D/C):    [x] Blood sugar <   250  And >  80  For exercise  [x] Patient is medication compliant  [] Patient controls blood sugar via diet  [] Blood sugar <    And >    For fasting blood sugar     *Please see recent flow sheets  Newly diagnosed:   []  Yes  [x]  No    [] N/A     If yes, describe date of onset and medication changes:    STG Status/UPOC:  Per patient report    Compliant with diabetic medication:   [x] Yes   []  No, continue    Compliant with diabetic diet:  [] Yes   [x]  No, continue    Monitors blood sugar at home:  [x] Yes   []  No, continue    Takes appropriate corrective actions when blood glucose is out of range:  [x] Yes   []  No, continue    Other:        *Please see recent flow sheets  Newly diagnosed:   []  Yes  []  No    [] N/A     If yes, describe date of onset and medication changes:    STG Status/UPOC:  Per patient report    Compliant with diabetic medication:  [] Yes   []  No, continue      Compliant with diabetic diet:  [] Yes   []  No, continue    Monitors blood sugar at home:  [] Yes   []  No, continue    Takes appropriate corrective actions when blood glucose is out of range:  [] Yes   []  No, continue    Other:          LTG (to be achieved by D/C):    [x] Blood sugar < 250    And > 80   For exercise  [x] Patient is medication compliant  [] Patient controls blood sugar via diet  [] Blood sugar <    And >    For fasting blood sugar       LTG (to be achieved by D/C):    [] Blood sugar <     And >    For exercise  [] Patient is medication compliant  [] Patient controls blood sugar via diet  [] Blood sugar <    And >    For fasting blood sugar                     Initial Assessment Clinician Notes: Patient's deficits seem to be more related to pain than to breathing. Patient confirms this. Patient Goal(s):     Aliyah Junior was an active participant in the development of this ITP and is agreeable to treatment within an open gym environment.     Supervising Physician: Dr. Sameer Perales, RT  11/17/2017,8:24 AM    Medical Director ITP and Exercise Prescription Approval  Respiratory Therapy    Initial Evaluation and Exercise Prescription:  The Medical Directors electronic co-signature validates that provider has reviewed the above findings and concur with these findings with any changes and/or additional comments noted below. I certify the need for Respiratory therapy furnished under this plan of treatment and while under my care. I agree with the plan and certify that this therapy is necessary. Certification Period:    30-Day Reassessment:  Patient's functional level; progress towards goals; carry-over learning within the home; problems, concerns and/or deficits; treatment plan (treatment modalities); and patient goals were reviewed with the patient. Additional Physician findings and/or comments (if applicable). The Medical Directors electronic co-signature validates that provider has reviewed the above findings and concur with these findings with any changes and/or additional comments noted below. I certify the need for Respiratory therapy furnished under this plan of treatment and while under my care. I agree with the plan and certify that this therapy is necessary. Certification [id number]      60- Day Reassessment:  Patient's functional level; progress towards goals; carry-over learning within the home; problems, concerns and/or deficits; treatment plan (treatment modalities); and patient goals were reviewed with the patient. Additional Physician findings and/or comments (if applicable). The Medical Directors electronic co-signature validates that provider has reviewed the above findings and concur with these findings with any changes and/or additional comments noted below. I certify the need for Respiratory therapy furnished under this plan of treatment and while under my care. I agree with the plan and certify that this therapy is necessary. Certification Period:        Respiratory Therapy Discharge Assessment and Long-Term Goals    Exercise (Required):  [x] LTG. 1:  Exercise > 100    Minutes of exercise weekly, with O2 saturations >  89    , RPD < 2         - LTG 1.  Status: []-Met  [x]-Not Met Comments:Patient had poor attendance and did not complete program.     [x] LTG 2: Increase 6MW test by  50    Feet (Initial 6-MW Test feet:   211     ,Discharge 6-MW Test Distance:    n/a     )     - LTG 2. Status: []-Met  [x]-Not Met Comments:Patient did not complete program.     Discharge Plan:    []-Maintenance Program    []-HEP     [x]-Other: Patient discharged from program due to poor attendance with no response to multiple phone calls. Nutrition (Required):  [x] LTG. 1:  Patient able to verbalize an understanding of life-long adherence to a healthy diet and the impact on health conditions. Comments:    -LTG Status: []-Met  [x]-Not Met Comments:       Initial RYP score:    Discharge RYP score:  Discharge Plan: []-Maintain dietary adherence     []-Other:    Psychosocial (Required):  [x] LTG. 1:  Use relaxation techniques when stressors are identified and verbalize coping skills and decrease vulnerability to stress.    - LTG 1. Status: []-Met  [x]-Not Met Comments:  [] LTG. 2: Maintain decreased smoking behaviors/smoking cessation      - LTG 2. Status: []-Met  []-Not Met Comments:  [x] LTG. 3: Improve PHQ-9 score (Initial PHQ-9 score:  11    , Discharge PHQ-9 score       )      - LTG 3. Status: []-Met  [x]-Not Met Comments:Patient did not complete program.    [x] LTG. 4: Improve [employer] scores and maintain a healthy psychosocial well-being     - LTG 4. Status: []-Met  [x]-Not Met Comments:Patient did not complete program.    Discharge Plan: []-Maintain healthy psychosocial well-being     []-Other:    *See [facility] Quality of Life tool for pre and post-treatment screenings. Oxygen (Required):  [x] LTG. 1: Patient will independently preform pursed-lip breathing (initial 6MW test (during) Sats:           DC 6MW test (during) Sats:     - LTG 1.  Status: []-Met  [x]-Not Met Comments:       [x] LTG. 2: Patient will independently perform diaphragmatic breathing      - LTG 2. Status: []-Met  [x]-Not Met Comments:    [] LTG. 3: Patient will follow Oxygen prescription     - LTG 3. Status: []-Met  []-Not Met Comments:    Discharge Plan: []-Maintain healthy oxygen saturations     [x]-Other: No plan. Patient did not complete program.     CHF (Prevention of Exacerbation): []-Risk Factor [x]-Not a Risk Factor at This Time  [] LTG. 1: Able to verbalize individual baselines, signs and symptoms of worsening heart failure and appropriate self-mangement. -LTG Status1: []-Met  []-Not Met Comments:  [] LTG. 2: Compliant with medications     -LTG Status 2: []-Met  []-Not Met Comments:  [] LTG. 3: Compliant with Sodium intake and fluid intake     -LTG Status 3: []-Met  []-Not Met Comments:  [] LTG. 4: Weighs self daily     -LTG Status 4: []-Met []-Not Met Comments:    [] LTG. 5: Monitors blood pressure at home     -LTG Status 5: []-Met []-Not Met Comments:       Discharge Plan:  []-Maintain CHF exacerbation prevention     []-Other:      Hypertension: []-Risk Factor [x]-Not a Risk Factor at This Time  [] LTG. 1: Consistent resting blood pressure <           Initial Resting BP:         DC Resting BP:     -LTG Status 1: []-Met []-Not Met Comments:    [] LTG.  2: Monitors Blood Pressure at home     -LTG Status 2: []-Met []-Not Met Comments:     [] LTG. 3: Blood Pressure medication compliance        -LTG Status 3: []-Met []-Not Met Comments:    Discharge Plan: []-Maintain healthy blood pressure   []-Other:     Diabetes:    [x]- Risk Factor      []-- Not a Risk Factor at This Time  [x] LTG. 1: Blood Sugar <  250     And >      80   For exercise     -LTG Status 1: []-Met  [x]-Not Met Comments:Patient did not complete program.     [x] LTG. 2: Patient is medication compliant      -LTG Status 2: []-Met  [x]-Not Met Comments:    [x] LTG. 3: Patient controls blood sugar via diet     -LTG Status 3: []-Met  [x]-Not Met Comments:     [] LTG. 4: Blood Sugar <        And >       For fasting blood sugar     -LTG Status 4: []-Met  []-Not Met Comments:    Initial Fasting Blood Glucose: Discharge Fasting Blood Glucose:     Discharge Plan: []-Maintain healthy glucose levels     []-Other:      Clinicians Name:Lori Rosaline Merlin, RRT   Date/Time 1/12/2018 1300        I have reviewed the above findings and concur with these findings with any changes and/or additional comments noted below.   I certify I have conducted the outcome assessment (above) based on patient-centered outcomes (including the [place]) which includes objective clinical measurements of the effectiveness of the respiratory therapy program for this individual.

## 2018-10-31 NOTE — PROGRESS NOTES
POSTPARTUM PROGRESS NOTE     Maria Eugenia Briscoe is a 37 y.o. female POD #2 status post Repeat  section at 36w2d in a pregnancy complicated by AMA, h/o c/s, and h/o myomectomy. Patient is doing well this morning. She denies nausea, vomiting, fever or chills. Patient reports moderate abdominal pain that is well relieved by oral pain medications. Lochia is mild to moderate and stable. Patient is voiding and ambulating without difficulty. She has passed flatus and has had a BM. Patient does plan to breast feed. Will discuss contraception with Dr. Neumann. She desires circumcision.     Objective:       Temp:  [97.8 °F (36.6 °C)-98.5 °F (36.9 °C)] 98.2 °F (36.8 °C)  Pulse:  [72-87] 79  Resp:  [18] 18  SpO2:  [96 %-98 %] 98 %  BP: ()/(53-73) 108/58    General:   alert, appears stated age and cooperative   Lungs:   clear to auscultation bilaterally   Heart:   regular rate and rhythm, S1, S2 normal, no murmur, click, rub or gallop   Abdomen:  soft, non-tender; bowel sounds normal; no masses,  no organomegaly   Uterus:  firm located at the umblicus.    Incision: Clean, dry, and intact   Extremities: peripheral pulses normal, no pedal edema, no clubbing or cyanosis     Lab Review  No results found for this or any previous visit (from the past 4 hour(s)).    I/O  No intake or output data in the 24 hours ending 10/31/18 0645     Assessment:     Patient Active Problem List   Diagnosis    History of myomectomy    S/P     Leakage of amniotic fluid        Plan:   1. Postpartum care s/p RLTCS:  - Patient doing well. Continue routine management and advances.  - Continue PO pain meds. Pain well controlled.  - Heme: H/h 12.3/36.6> 10.4/30.8  - Encourage ambulation  - Circumcision desired, consents reviewed and signed  - Contraception per VG  - Lactation prn      Dispo: As patient meets milestones, will plan to discharge POD#3-4.    Catrina Hudson MD  OBGYN, PGY-1

## 2018-11-01 PROCEDURE — 11000001 HC ACUTE MED/SURG PRIVATE ROOM

## 2018-11-01 PROCEDURE — 25000003 PHARM REV CODE 250: Performed by: STUDENT IN AN ORGANIZED HEALTH CARE EDUCATION/TRAINING PROGRAM

## 2018-11-01 RX ORDER — OXYCODONE AND ACETAMINOPHEN 5; 325 MG/1; MG/1
1 TABLET ORAL EVERY 4 HOURS PRN
Qty: 15 TABLET | Refills: 0 | Status: SHIPPED | OUTPATIENT
Start: 2018-11-01 | End: 2018-12-14

## 2018-11-01 RX ORDER — IBUPROFEN 600 MG/1
600 TABLET ORAL EVERY 6 HOURS
Qty: 30 TABLET | Refills: 1 | Status: SHIPPED | OUTPATIENT
Start: 2018-11-01 | End: 2018-12-14

## 2018-11-01 RX ADMIN — DOCUSATE SODIUM 200 MG: 100 CAPSULE, LIQUID FILLED ORAL at 09:11

## 2018-11-01 RX ADMIN — OXYCODONE HYDROCHLORIDE AND ACETAMINOPHEN 1 TABLET: 5; 325 TABLET ORAL at 04:11

## 2018-11-01 RX ADMIN — OXYCODONE HYDROCHLORIDE AND ACETAMINOPHEN 1 TABLET: 5; 325 TABLET ORAL at 12:11

## 2018-11-01 RX ADMIN — IBUPROFEN 600 MG: 600 TABLET ORAL at 12:11

## 2018-11-01 RX ADMIN — IBUPROFEN 600 MG: 600 TABLET ORAL at 06:11

## 2018-11-01 RX ADMIN — OXYCODONE HYDROCHLORIDE AND ACETAMINOPHEN 1 TABLET: 5; 325 TABLET ORAL at 10:11

## 2018-11-01 RX ADMIN — DOCUSATE SODIUM 200 MG: 100 CAPSULE, LIQUID FILLED ORAL at 12:11

## 2018-11-01 RX ADMIN — DOCUSATE SODIUM 200 MG: 100 CAPSULE, LIQUID FILLED ORAL at 10:11

## 2018-11-01 NOTE — PLAN OF CARE
Problem: Breastfeeding (Adult,Obstetrics,Pediatric)  Goal: Signs and Symptoms of Listed Potential Problems Will be Absent, Minimized or Managed (Breastfeeding)  Signs and symptoms of listed potential problems will be absent, minimized or managed by discharge/transition of care (reference Breastfeeding (Adult,Obstetrics,Pediatric) CPG).  Outcome: Ongoing (interventions implemented as appropriate)  Mom to breastfeed infant 8 or more in 24 hours, pump & supplement infant.

## 2018-11-01 NOTE — LACTATION NOTE
"   11/01/18 1010   Maternal Infant Assessment   Breast Shape Bilateral:;round   Breast Density Bilateral:;filling   Areola Bilateral:;elastic   Nipple(s) Bilateral:;everted   Infant Assessment   Sucking Reflex present   Rooting Reflex present   Swallow Reflex present   LATCH Score   Latch 0-->too sleepy or reluctant, no latch achieved   Audible Swallowing 0-->none   Type Of Nipple 2-->everted (after stimulation)   Comfort (Breast/Nipple) 1-->filling, red/small blisters/bruises, mild/mod discomfort   Hold (Positioning) 1-->minimal assist, teach one side: mother does other, staff holds   Score (less than 7 for 2/more consecutive times, consult Lactation Consultant) 4   Maternal Infant Feeding   Maternal Emotional State assist needed;relaxed   Infant Positioning clutch/"football"   Breast Milk Supply Volume (ml) 24 ml   Time Spent (min) 30-60 min   Breastfeeding Education adequate infant intake;importance of skin-to-skin contact;label/storage of breast milk;milk expression, electric pump   Breastfeeding History   Currently Breastfeeding no   Feeding Infant   Feeding Readiness Cues smacking   Effective Latch During Feeding no   Audible Swallow no   Suck/Swallow Coordination absent   Equipment Type/Education   Pump Type Symphony   Breast Pump Type double electric, hospital grade   Breast Pump Flange Type hard   Breast Pump Flange Size 27 mm   Breast Pumping pumped until emptied  (after nursing for extra stimulation)   Pumping Frequency (times) (8 or more in 24)   Lactation Referrals   Lactation Consult Breastfeeding assessment;Follow up;Knowledge deficit   Lactation Interventions   Attachment Promotion counseling provided;skin-to-skin contact encouraged   Breastfeeding Assistance assisted with positioning;electric breast pump used;feeding cue recognition promoted;feeding on demand promoted;feeding session observed;milk expression/pumping;supplemental feeding provided;support offered   Maternal Breastfeeding Support " diary/feeding log utilized;encouragement offered;lactation counseling provided   Latch Promotion positioning assisted;suck stimulated with breast milk drop;infant moved to breast   Assisted mom with positioning & latch. After multiple position changes unable to achieve latch due to infant sleepiness. Mom pumping and LC cupfed infant 15mls. Plan: mom is to nurse infant on cue 8 or more times in 24 hours, pump and supplement with EBM. Mom to call LC as needed for further assistance,

## 2018-11-01 NOTE — PROGRESS NOTES
POSTPARTUM PROGRESS NOTE     Maria Eugenia Briscoe is a 37 y.o. female POD #3 status post Repeat  section at 36w2d in a pregnancy complicated by AMA, h/o c/s, and h/o myomectomy. Patient is doing well this morning. She denies nausea, vomiting, fever or chills. Patient reports moderate abdominal pain that is well relieved by oral pain medications. Lochia is mild to moderate and stable. Patient is voiding and ambulating without difficulty. She has passed flatus and has had a BM. Patient does plan to breast feed. Will discuss contraception with Dr. Neumann.     Objective:       Temp:  [97.3 °F (36.3 °C)-99 °F (37.2 °C)] 98.5 °F (36.9 °C)  Pulse:  [86-95] 95  Resp:  [18] 18  SpO2:  [97 %-100 %] 97 %  BP: (117-124)/(69-72) 117/69    General:   alert, appears stated age and cooperative   Lungs:   clear to auscultation bilaterally   Heart:   regular rate and rhythm, S1, S2 normal, no murmur, click, rub or gallop   Abdomen:  soft, non-tender; bowel sounds normal; no masses,  no organomegaly   Uterus:  firm located at the umblicus.    Incision: Clean, dry, and intact   Extremities: peripheral pulses normal, no pedal edema, no clubbing or cyanosis     Lab Review  No results found for this or any previous visit (from the past 4 hour(s)).    I/O  No intake or output data in the 24 hours ending 18 0658     Assessment:     Patient Active Problem List   Diagnosis    History of myomectomy    S/P     Leakage of amniotic fluid        Plan:   1. Postpartum care s/p RLTCS:  - Patient doing well. Continue routine management and advances.  - Continue PO pain meds. Pain well controlled.  - Heme: H/h 12.3/36.6> 10.4/30.8  - Encourage ambulation  - Circumcision performed yesterday  - Contraception per VG  - Lactation prn      Dispo: As patient meets milestones, will plan to discharge POD#3-4.    Catrina Hudson MD  OBGYN, PGY-1

## 2018-11-01 NOTE — LACTATION NOTE
This note was copied from a baby's chart.  Mom latched infant independently, rhythmic sucking noted with audible swallows throughout feeding. Mom's breasts are full, used warm compresses and breast compression/massage while nursing and breasts became softer after nursing. Mom is to pump after each nursing session and will use ice packs after to reduce inflammation. Mom agreeable to plan.

## 2018-11-02 VITALS
OXYGEN SATURATION: 98 % | HEIGHT: 64 IN | WEIGHT: 179 LBS | BODY MASS INDEX: 30.56 KG/M2 | TEMPERATURE: 98 F | SYSTOLIC BLOOD PRESSURE: 111 MMHG | DIASTOLIC BLOOD PRESSURE: 58 MMHG | RESPIRATION RATE: 18 BRPM | HEART RATE: 68 BPM

## 2018-11-02 PROBLEM — Z51.5 COMFORT MEASURES ONLY STATUS: Status: ACTIVE | Noted: 2018-11-02

## 2018-11-02 PROCEDURE — 25000003 PHARM REV CODE 250: Performed by: STUDENT IN AN ORGANIZED HEALTH CARE EDUCATION/TRAINING PROGRAM

## 2018-11-02 PROCEDURE — 99024 POSTOP FOLLOW-UP VISIT: CPT | Mod: ,,, | Performed by: OBSTETRICS & GYNECOLOGY

## 2018-11-02 RX ADMIN — OXYCODONE HYDROCHLORIDE AND ACETAMINOPHEN 1 TABLET: 5; 325 TABLET ORAL at 05:11

## 2018-11-02 RX ADMIN — IBUPROFEN 600 MG: 600 TABLET ORAL at 12:11

## 2018-11-02 RX ADMIN — IBUPROFEN 600 MG: 600 TABLET ORAL at 05:11

## 2018-11-02 RX ADMIN — DOCUSATE SODIUM 200 MG: 100 CAPSULE, LIQUID FILLED ORAL at 08:11

## 2018-11-02 NOTE — PLAN OF CARE
Problem: Patient Care Overview  Goal: Plan of Care Review  Outcome: Outcome(s) achieved Date Met: 11/02/18  Patient doing well. VS stable. Patient states that she is ready to be DC at this time. Patient states that she feels ready to breastfeed independently but states she will follow up with lactation.

## 2018-11-02 NOTE — LACTATION NOTE
11/02/18 1015   Maternal Infant Feeding   Maternal Emotional State independent;relaxed   Presence of Pain no   Time Spent (min) 15-30 min   Engorgement Measures manual expression pre feeding;supportive bra encouraged   Breastfeeding Education adequate infant intake;adequate milk volume;diet;importance of skin-to-skin contact;label/storage of breast milk;milk expression, electric pump;milk expression, hand;prenatal vitamins continued;returning to work   Breastfeeding History   Currently Breastfeeding no   Equipment Type/Education   Pump Type Symphony   Breast Pump Type double electric, hospital grade   Breast Pump Flange Type hard   Breast Pump Flange Size 27 mm   Breast Pumping pumped until emptied   Pumping Frequency (times) (after nursing for extar stimulation)   Lactation Referrals   Lactation Consult Follow up;Knowledge deficit   Lactation Interventions   Attachment Promotion counseling provided;skin-to-skin contact encouraged   Breastfeeding Assistance both breasts offered each feeding;electric breast pump used;feeding cue recognition promoted;feeding on demand promoted;milk expression/pumping;support offered   Maternal Breastfeeding Support diary/feeding log utilized;encouragement offered;maternal rest encouraged   Lactation discharge education completed, all questions answered. Infant just finished feeding & mom states it went well. Moms plan is to nurse infant on cue 8 or more times in 24 hours, supplement infant with EBM until content, then pump for extra stimulation. Mom is agreeable to plan. Education and handout given on paced bottle feeding. Mom to call LC as needed for further assistance.

## 2018-11-02 NOTE — PLAN OF CARE
Problem: Breastfeeding (Adult,Obstetrics,Pediatric)  Goal: Signs and Symptoms of Listed Potential Problems Will be Absent, Minimized or Managed (Breastfeeding)  Signs and symptoms of listed potential problems will be absent, minimized or managed by discharge/transition of care (reference Breastfeeding (Adult,Obstetrics,Pediatric) CPG).  Outcome: Outcome(s) achieved Date Met: 11/02/18   Encouraged nursing infant at least 8 times in 24 hours on cue until content. She will then supplement infant until content and pump for extra stimulation.

## 2018-11-02 NOTE — PROGRESS NOTES
POSTPARTUM PROGRESS NOTE     Maria Eugenia Briscoe is a 37 y.o. female POD #4 status post Repeat  section at 36w2d in a pregnancy complicated by AMA, h/o c/s, and h/o myomectomy. Patient is doing well this morning. She denies nausea, vomiting, fever or chills. Patient reports moderate abdominal pain that is well relieved by oral pain medications. Lochia is mild to moderate and stable. Patient is voiding and ambulating without difficulty. She has passed flatus and has had a BM. Patient does plan to breast feed. Will discuss contraception with Dr. Neumann.     Objective:       Temp:  [98.1 °F (36.7 °C)-98.4 °F (36.9 °C)] 98.1 °F (36.7 °C)  Pulse:  [72-88] 75  Resp:  [18] 18  SpO2:  [97 %-98 %] 97 %  BP: (111-122)/(55-73) 111/55    General:   alert, appears stated age and cooperative   Lungs:   clear to auscultation bilaterally   Heart:   regular rate and rhythm, S1, S2 normal, no murmur, click, rub or gallop   Abdomen:  soft, non-tender; bowel sounds normal; no masses,  no organomegaly   Uterus:  firm located at the umblicus.    Incision: Clean, dry, and intact   Extremities: peripheral pulses normal, no pedal edema, no clubbing or cyanosis     Lab Review  No results found for this or any previous visit (from the past 4 hour(s)).    I/O  No intake or output data in the 24 hours ending 18 0647     Assessment:     Patient Active Problem List   Diagnosis    History of myomectomy    S/P     Leakage of amniotic fluid        Plan:   1. Postpartum care s/p RLTCS:  - Patient doing well. Continue routine management and advances.  - Continue PO pain meds. Pain well controlled.  - Heme: H/h 12.3/36.6> 10.4/30.8  - Encourage ambulation  - Infant is status post circumcision  - Contraception per VG  - Lactation prn      Dispo: As patient meets milestones, will plan to discharge today.    Catrina Hudson MD  OBGYN, PGY-1

## 2018-11-07 ENCOUNTER — TELEPHONE (OUTPATIENT)
Dept: OBSTETRICS AND GYNECOLOGY | Facility: CLINIC | Age: 38
End: 2018-11-07

## 2018-11-07 ENCOUNTER — RESEARCH ENCOUNTER (OUTPATIENT)
Dept: RESEARCH | Facility: HOSPITAL | Age: 38
End: 2018-11-07

## 2018-11-07 NOTE — TELEPHONE ENCOUNTER
----- Message from Irma Vlila sent at 11/7/2018  2:37 PM CST -----  Contact: Rosie Garay ([employer])   Name of Who is Calling: Rosie Garay ([employer])       What is the request in detail: Rosie Garay ([employer]) is requesting a call in regards to patients disability claim.....Please contact to further discuss and advise.       Can the clinic reply by [facility]: no       What [facility] Back [facility]: [phone]

## 2018-11-07 NOTE — TELEPHONE ENCOUNTER
Hello, this is Christopher calling from the OBGYN clinic at [facility]. Returning a call to Rosie Garay regarding the pt's leave paperwork. ( No answer, left VM message for Rosie to call the clinic back.)

## 2018-11-07 NOTE — PROGRESS NOTES
TXA 1-2 Week Follow Up    Spoke with . No hospial visits since discharge; or any thromboembolism, stroke, MI, seizure, endometritis, SSI, or pelvic abscess. No transfusion or blood products since discharge. No open label TXA, other antifibrinolytics, or uterotonics given since discharge. No acute elevation of serum creatinine. No transfusion associated reactions. No radiologic or reoperations since discharge.    Told pt we'd follow up again at 6 week PP.

## 2018-11-08 ENCOUNTER — TELEPHONE (OUTPATIENT)
Dept: OBSTETRICS AND GYNECOLOGY | Facility: CLINIC | Age: 38
End: 2018-11-08

## 2018-11-08 ENCOUNTER — HOSPITAL ENCOUNTER (INPATIENT)
Facility: OTHER | Age: 38
LOS: 1 days | Discharge: HOME | DRG: 776 | End: 2018-11-09
Attending: OBSTETRICS & GYNECOLOGY | Admitting: OBSTETRICS & GYNECOLOGY
Payer: COMMERCIAL

## 2018-11-08 PROBLEM — N71.9 ENDOMETRITIS: Status: ACTIVE | Noted: 2018-11-08

## 2018-11-08 LAB
ABO + RH BLD: NORMAL
ALBUMIN SERPL BCP-MCNC: 3.3 G/DL
ALP SERPL-CCNC: 82 U/L
ALT SERPL W/O P-5'-P-CCNC: 21 U/L
ANION GAP SERPL CALC-SCNC: 14 MMOL/L
AST SERPL-CCNC: 24 U/L
BASOPHILS # BLD AUTO: 0.04 K/UL
BASOPHILS NFR BLD: 0.2 %
BILIRUB SERPL-MCNC: 0.6 MG/DL
BLD GP AB SCN CELLS X3 SERPL QL: NORMAL
BUN SERPL-MCNC: 11 MG/DL
CALCIUM SERPL-MCNC: 9.2 MG/DL
CHLORIDE SERPL-SCNC: 104 MMOL/L
CO2 SERPL-SCNC: 20 MMOL/L
CREAT SERPL-MCNC: 0.6 MG/DL
DIFFERENTIAL METHOD: ABNORMAL
EOSINOPHIL # BLD AUTO: 0.1 K/UL
EOSINOPHIL NFR BLD: 0.3 %
ERYTHROCYTE [DISTWIDTH] IN BLOOD BY AUTOMATED COUNT: 12.4 %
EST. GFR  (AFRICAN AMERICAN): >60 ML/MIN/1.73 M^2
EST. GFR  (NON AFRICAN AMERICAN): >60 ML/MIN/1.73 M^2
GENTAMICIN PEAK SERPL-MCNC: 3.9 UG/ML
GLUCOSE SERPL-MCNC: 80 MG/DL
HCT VFR BLD AUTO: 36.1 %
HGB BLD-MCNC: 11.9 G/DL
LYMPHOCYTES # BLD AUTO: 1.7 K/UL
LYMPHOCYTES NFR BLD: 10.2 %
MCH RBC QN AUTO: 31.6 PG
MCHC RBC AUTO-ENTMCNC: 33 G/DL
MCV RBC AUTO: 96 FL
MONOCYTES # BLD AUTO: 0.7 K/UL
MONOCYTES NFR BLD: 4.2 %
NEUTROPHILS # BLD AUTO: 13.8 K/UL
NEUTROPHILS NFR BLD: 84.9 %
PLATELET # BLD AUTO: 341 K/UL
PMV BLD AUTO: 9.5 FL
POTASSIUM SERPL-SCNC: 3.5 MMOL/L
PROT SERPL-MCNC: 8.4 G/DL
RBC # BLD AUTO: 3.76 M/UL
SODIUM SERPL-SCNC: 138 MMOL/L
WBC # BLD AUTO: 16.21 K/UL

## 2018-11-08 PROCEDURE — 80170 ASSAY OF GENTAMICIN: CPT

## 2018-11-08 PROCEDURE — 63600175 PHARM REV CODE 636 W HCPCS: Performed by: OBSTETRICS & GYNECOLOGY

## 2018-11-08 PROCEDURE — 80053 COMPREHEN METABOLIC PANEL: CPT

## 2018-11-08 PROCEDURE — 99222 1ST HOSP IP/OBS MODERATE 55: CPT | Mod: 24,,, | Performed by: OBSTETRICS & GYNECOLOGY

## 2018-11-08 PROCEDURE — 63600175 PHARM REV CODE 636 W HCPCS: Performed by: STUDENT IN AN ORGANIZED HEALTH CARE EDUCATION/TRAINING PROGRAM

## 2018-11-08 PROCEDURE — S0077 INJECTION, CLINDAMYCIN PHOSP: HCPCS | Performed by: STUDENT IN AN ORGANIZED HEALTH CARE EDUCATION/TRAINING PROGRAM

## 2018-11-08 PROCEDURE — 11000001 HC ACUTE MED/SURG PRIVATE ROOM

## 2018-11-08 PROCEDURE — 96375 TX/PRO/DX INJ NEW DRUG ADDON: CPT

## 2018-11-08 PROCEDURE — 25000003 PHARM REV CODE 250: Performed by: STUDENT IN AN ORGANIZED HEALTH CARE EDUCATION/TRAINING PROGRAM

## 2018-11-08 PROCEDURE — 25000003 PHARM REV CODE 250: Performed by: OBSTETRICS & GYNECOLOGY

## 2018-11-08 PROCEDURE — 96374 THER/PROPH/DIAG INJ IV PUSH: CPT

## 2018-11-08 PROCEDURE — 87086 URINE CULTURE/COLONY COUNT: CPT

## 2018-11-08 PROCEDURE — 99284 EMERGENCY DEPT VISIT MOD MDM: CPT | Mod: 24,,, | Performed by: OBSTETRICS & GYNECOLOGY

## 2018-11-08 PROCEDURE — 86901 BLOOD TYPING SEROLOGIC RH(D): CPT

## 2018-11-08 PROCEDURE — 99285 EMERGENCY DEPT VISIT HI MDM: CPT | Mod: 25

## 2018-11-08 PROCEDURE — 85025 COMPLETE CBC W/AUTO DIFF WBC: CPT

## 2018-11-08 PROCEDURE — 36415 COLL VENOUS BLD VENIPUNCTURE: CPT

## 2018-11-08 RX ORDER — OXYCODONE AND ACETAMINOPHEN 10; 325 MG/1; MG/1
1 TABLET ORAL EVERY 4 HOURS PRN
Status: DISCONTINUED | OUTPATIENT
Start: 2018-11-08 | End: 2018-11-09 | Stop reason: HOSPADM

## 2018-11-08 RX ORDER — SODIUM CHLORIDE 9 MG/ML
INJECTION, SOLUTION INTRAVENOUS CONTINUOUS
Status: DISCONTINUED | OUTPATIENT
Start: 2018-11-08 | End: 2018-11-08

## 2018-11-08 RX ORDER — ONDANSETRON 8 MG/1
8 TABLET, ORALLY DISINTEGRATING ORAL EVERY 8 HOURS PRN
Status: DISCONTINUED | OUTPATIENT
Start: 2018-11-08 | End: 2018-11-09 | Stop reason: HOSPADM

## 2018-11-08 RX ORDER — OXYCODONE AND ACETAMINOPHEN 5; 325 MG/1; MG/1
1 TABLET ORAL EVERY 4 HOURS PRN
Status: DISCONTINUED | OUTPATIENT
Start: 2018-11-08 | End: 2018-11-09 | Stop reason: HOSPADM

## 2018-11-08 RX ORDER — MISOPROSTOL 200 UG/1
400 TABLET ORAL ONCE
Status: DISCONTINUED | OUTPATIENT
Start: 2018-11-08 | End: 2018-11-08

## 2018-11-08 RX ORDER — HYDROMORPHONE HYDROCHLORIDE 2 MG/ML
1 INJECTION, SOLUTION INTRAMUSCULAR; INTRAVENOUS; SUBCUTANEOUS ONCE
Status: COMPLETED | OUTPATIENT
Start: 2018-11-08 | End: 2018-11-08

## 2018-11-08 RX ORDER — SODIUM CHLORIDE, SODIUM LACTATE, POTASSIUM CHLORIDE, CALCIUM CHLORIDE 600; 310; 30; 20 MG/100ML; MG/100ML; MG/100ML; MG/100ML
INJECTION, SOLUTION INTRAVENOUS CONTINUOUS
Status: DISCONTINUED | OUTPATIENT
Start: 2018-11-08 | End: 2018-11-09 | Stop reason: HOSPADM

## 2018-11-08 RX ORDER — MISOPROSTOL 200 UG/1
200 TABLET ORAL
Status: DISCONTINUED | OUTPATIENT
Start: 2018-11-08 | End: 2018-11-09 | Stop reason: HOSPADM

## 2018-11-08 RX ORDER — OXYCODONE HYDROCHLORIDE 5 MG/1
5 TABLET ORAL ONCE
Status: COMPLETED | OUTPATIENT
Start: 2018-11-08 | End: 2018-11-08

## 2018-11-08 RX ORDER — KETOROLAC TROMETHAMINE 30 MG/ML
30 INJECTION, SOLUTION INTRAMUSCULAR; INTRAVENOUS EVERY 6 HOURS PRN
Status: DISCONTINUED | OUTPATIENT
Start: 2018-11-08 | End: 2018-11-09 | Stop reason: HOSPADM

## 2018-11-08 RX ORDER — KETOROLAC TROMETHAMINE 30 MG/ML
30 INJECTION, SOLUTION INTRAMUSCULAR; INTRAVENOUS ONCE
Status: COMPLETED | OUTPATIENT
Start: 2018-11-08 | End: 2018-11-08

## 2018-11-08 RX ORDER — SODIUM CHLORIDE 0.9 % (FLUSH) 0.9 %
3 SYRINGE (ML) INJECTION
Status: DISCONTINUED | OUTPATIENT
Start: 2018-11-08 | End: 2018-11-09 | Stop reason: HOSPADM

## 2018-11-08 RX ORDER — CLINDAMYCIN PHOSPHATE 900 MG/50ML
900 INJECTION, SOLUTION INTRAVENOUS
Status: DISCONTINUED | OUTPATIENT
Start: 2018-11-08 | End: 2018-11-09

## 2018-11-08 RX ADMIN — CLINDAMYCIN IN 5 PERCENT DEXTROSE 900 MG: 18 INJECTION, SOLUTION INTRAVENOUS at 02:11

## 2018-11-08 RX ADMIN — KETOROLAC TROMETHAMINE 30 MG: 30 INJECTION, SOLUTION INTRAMUSCULAR at 10:11

## 2018-11-08 RX ADMIN — MISOPROSTOL 200 MCG: 200 TABLET ORAL at 05:11

## 2018-11-08 RX ADMIN — CLINDAMYCIN IN 5 PERCENT DEXTROSE 900 MG: 18 INJECTION, SOLUTION INTRAVENOUS at 08:11

## 2018-11-08 RX ADMIN — AMPICILLIN SODIUM 2 G: 2 INJECTION, POWDER, FOR SOLUTION INTRAMUSCULAR; INTRAVENOUS at 05:11

## 2018-11-08 RX ADMIN — OXYCODONE HYDROCHLORIDE 5 MG: 5 TABLET ORAL at 10:11

## 2018-11-08 RX ADMIN — AMPICILLIN SODIUM 2 G: 2 INJECTION, POWDER, FOR SOLUTION INTRAMUSCULAR; INTRAVENOUS at 11:11

## 2018-11-08 RX ADMIN — OXYCODONE HYDROCHLORIDE AND ACETAMINOPHEN 1 TABLET: 5; 325 TABLET ORAL at 02:11

## 2018-11-08 RX ADMIN — GENTAMICIN SULFATE 110 MG: 40 INJECTION, SOLUTION INTRAMUSCULAR; INTRAVENOUS at 10:11

## 2018-11-08 RX ADMIN — OXYCODONE HYDROCHLORIDE AND ACETAMINOPHEN 1 TABLET: 5; 325 TABLET ORAL at 08:11

## 2018-11-08 RX ADMIN — MISOPROSTOL 400 MCG: 200 TABLET ORAL at 12:11

## 2018-11-08 RX ADMIN — HYDROMORPHONE HYDROCHLORIDE 1 MG: 2 INJECTION, SOLUTION INTRAMUSCULAR; INTRAVENOUS; SUBCUTANEOUS at 11:11

## 2018-11-08 RX ADMIN — AMPICILLIN SODIUM 2 G: 2 INJECTION, POWDER, FOR SOLUTION INTRAMUSCULAR; INTRAVENOUS at 12:11

## 2018-11-08 RX ADMIN — SODIUM CHLORIDE, SODIUM LACTATE, POTASSIUM CHLORIDE, AND CALCIUM CHLORIDE 1000 ML: .6; .31; .03; .02 INJECTION, SOLUTION INTRAVENOUS at 10:11

## 2018-11-08 RX ADMIN — SODIUM CHLORIDE, SODIUM LACTATE, POTASSIUM CHLORIDE, AND CALCIUM CHLORIDE: .6; .31; .03; .02 INJECTION, SOLUTION INTRAVENOUS at 01:11

## 2018-11-08 RX ADMIN — GENTAMICIN SULFATE 149.6 MG: 40 INJECTION, SOLUTION INTRAMUSCULAR; INTRAVENOUS at 03:11

## 2018-11-08 RX ADMIN — MISOPROSTOL 200 MCG: 200 TABLET ORAL at 08:11

## 2018-11-08 NOTE — ASSESSMENT & PLAN NOTE
- pt with elevated WBC count of 16, chills, and new onset of severe abdominal pain in RLQ, deep to incision  - blood and clots with 3cm EM stripe on bedside u/s  - bleeding increased, H/H stable at 11.9/36.1  - will start amp/gent/clinda  - cytotec series  - monitor CBC, will repeat in AM  - monitor vitals  - pain management with norco prn and dilaudid for btp

## 2018-11-08 NOTE — H&P
"[facility]  Obstetrics & Gynecology  History & Physical    Patient Name: Maria Eugenia Briscoe  MRN: [id number]  Admission Date: 2018  Primary Care Provider: Cortney Talbert MD    Subjective:     Chief Complaint/Reason for Admission: Endometritis    History of Present Illness:  Maria Eugenia Briscoe is a 37 y.o. [id number] who is POD#10 from CS presents complaining of severe abdominal pain and increased vaginal bleeding since this morning.  She reports right lower quadrant abdominal pain that feels deep to the incision. The pain is sharp and constant, and has been gradually increasing since this morning. She reports her bleeding had all but subsided yesterday until this morning, when she had suddenly heavier bleeding soaking through 2 pads in less than an hour and had further bleeding into the toilet. She states "the toilet was full of blood."  This IUP was complicated by h/o CS,  H/o myomectomy and AMA.    Scheduled Meds:   ampicillin IVPB  2 g Intravenous Q6H    lactated ringers  1,000 mL Intravenous Once    miSOPROStol  400 mcg Oral Once     Continuous Infusions:  PRN Meds:promethazine (PHENERGAN) IVPB    Review of patient's allergies indicates:  No Known Allergies    Objective:     Vital Signs (Most Recent):  Pulse: 78 (18 1104)  BP: 133/69 (18 1104) Vital Signs (24h Range):  Pulse:  [78] 78  BP: (106-133)/(59-69) 133/69        There is no height or weight on file to calculate BMI.  No LMP recorded.    I&O (Last 24H):  No intake or output data in the 24 hours ending 18 1200  Review of Systems   Constitutional: Positive for chills. Negative for diaphoresis and fever.   HENT: Negative for congestion.    Eyes: Negative for discharge.   Respiratory: Negative for shortness of breath.    Cardiovascular: Negative for chest pain and leg swelling.   Gastrointestinal: Positive for abdominal pain. Negative for blood in stool, constipation, diarrhea, nausea and vomiting. "   Genitourinary: Negative for dysuria and hematuria.   Musculoskeletal: Negative for back pain.   Skin: Negative for itching.   Neurological: Negative for dizziness, weakness and headaches.     Physical Exam:   Constitutional: She is oriented to person, place, and time. She appears well-developed and well-nourished. No distress.    HENT:   Head: Normocephalic and atraumatic.    Eyes: EOM are normal.    Neck: Normal range of motion.    Cardiovascular: Normal rate, regular rhythm and normal heart sounds.     Pulmonary/Chest: Effort normal and breath sounds normal. No respiratory distress.        Abdominal: Soft. There is tenderness (Moderate abdominal tenderness to palpation). There is no rebound and no guarding.                 Neurological: She is alert and oriented to person, place, and time.    Skin: Skin is warm and dry. She is not diaphoretic.    Psychiatric: She has a normal mood and affect. Her behavior is normal. Thought content normal.       Laboratory:  CBC:   Recent Labs   Lab 11/08/18  1059   WBC 16.21*   RBC 3.76*   HGB 11.9*   HCT 36.1*      MCV 96   MCH 31.6*   MCHC 33.0     CMP:   Recent Labs   Lab 11/08/18  1059   GLU 80   CALCIUM 9.2   ALBUMIN 3.3*   PROT 8.4      K 3.5   CO2 20*      BUN 11   CREATININE 0.6   ALKPHOS 82   ALT 21   AST 24   BILITOT 0.6       Diagnostic Results:  Clots/blood in endometrial cavity with 3cm EM stripe on bedside ultrasound.      Assessment/Plan:     * Endometritis    - pt with elevated WBC count of 16, chills, and new onset of severe abdominal pain in RLQ, deep to incision  - blood and clots with 3cm EM stripe on bedside u/s  - bleeding increased, H/H stable at 11.9/36.1  - will start amp/gent/clinda  - cytotec series  - monitor CBC, will repeat in AM  - monitor vitals  - pain management with norco prn and dilaudid for btp         Sushma K Reddy, MD  Obstetrics & Gynecology  [facility]

## 2018-11-08 NOTE — SUBJECTIVE & OBJECTIVE
Scheduled Meds:   ampicillin IVPB  2 g Intravenous Q6H    lactated ringers  1,000 mL Intravenous Once    miSOPROStol  400 mcg Oral Once     Continuous Infusions:  PRN Meds:promethazine (PHENERGAN) IVPB    Review of patient's allergies indicates:  No Known Allergies    Objective:     Vital Signs (Most Recent):  Pulse: 78 (11/08/18 1104)  BP: 133/69 (11/08/18 1104) Vital Signs (24h Range):  Pulse:  [78] 78  BP: (106-133)/(59-69) 133/69        There is no height or weight on file to calculate BMI.  No LMP recorded.    I&O (Last 24H):  No intake or output data in the 24 hours ending 11/08/18 1200  Review of Systems   Constitutional: Positive for chills. Negative for diaphoresis and fever.   HENT: Negative for congestion.    Eyes: Negative for discharge.   Respiratory: Negative for shortness of breath.    Cardiovascular: Negative for chest pain and leg swelling.   Gastrointestinal: Positive for abdominal pain. Negative for blood in stool, constipation, diarrhea, nausea and vomiting.   Genitourinary: Negative for dysuria and hematuria.   Musculoskeletal: Negative for back pain.   Skin: Negative for itching.   Neurological: Negative for dizziness, weakness and headaches.     Physical Exam:   Constitutional: She is oriented to person, place, and time. She appears well-developed and well-nourished. No distress.    HENT:   Head: Normocephalic and atraumatic.    Eyes: EOM are normal.    Neck: Normal range of motion.    Cardiovascular: Normal rate, regular rhythm and normal heart sounds.     Pulmonary/Chest: Effort normal and breath sounds normal. No respiratory distress.        Abdominal: Soft. There is tenderness (Moderate abdominal tenderness to palpation). There is no rebound and no guarding.                 Neurological: She is alert and oriented to person, place, and time.    Skin: Skin is warm and dry. She is not diaphoretic.    Psychiatric: She has a normal mood and affect. Her behavior is normal. Thought content  normal.       Laboratory:  CBC:   Recent Labs   Lab 11/08/18  1059   WBC 16.21*   RBC 3.76*   HGB 11.9*   HCT 36.1*      MCV 96   MCH 31.6*   MCHC 33.0     CMP:   Recent Labs   Lab 11/08/18  1059   GLU 80   CALCIUM 9.2   ALBUMIN 3.3*   PROT 8.4      K 3.5   CO2 20*      BUN 11   CREATININE 0.6   ALKPHOS 82   ALT 21   AST 24   BILITOT 0.6       Diagnostic Results:  Clots/blood in endometrial cavity with 3cm EM stripe on bedside ultrasound.

## 2018-11-08 NOTE — HPI
"Maria Eugenia Briscoe is a 37 y.o. [id number] who is POD#9 from CS presents complaining of severe abdominal pain and increased vaginal bleeding since this morning.  She reports right lower quadrant abdominal pain that feels deep to the incision. The pain is sharp and constant, and has been gradually increasing since this morning. She reports her bleeding had all but subsided yesterday until this morning, when she had suddenly heavier bleeding soaking through 2 pads in less than an hour and had further bleeding into the toilet. She states "the toilet was full of blood."  This IUP was complicated by H/o myomectomy and AMA.  "

## 2018-11-08 NOTE — PROGRESS NOTES
Patient arrived on unit at 1320; per family, patient's pad was changed 10 minutes prior to arriving on unit. Upon assessment, patient's had a gush of blood; patient's pad saturated. Patient escorted to bathroom with assist; patient voided, had another gush of blood, and passed a few small clots. NEW pad placed at 1345. Patient felt lightheaded while sitting on toilet; patient escorted back to bed via wheelchair. Patient's vital signs WDL. Patient reports that she feels much better now that she is in bed. Call light within in reach and patient's family at bedside. MD called and notified. MD notified. Per MD, call if patient saturates another pad in one hour.

## 2018-11-08 NOTE — TELEPHONE ENCOUNTER
Returned the pt's call to the clinic. Pt stated that she is completely saturating two Kotex overnight pads in an hour and she's passing clots and cramping. Patient stated that she just delivered her baby only a few days ago. Pt instructed to report to [facility] since she just delivered her baby to be evaluated for the heavy blood flow and cramping. Patient verbalized understanding.

## 2018-11-08 NOTE — HOSPITAL COURSE
11/08/2018 - patient admitted for presumed postpartum endometritis/subinvolution of the placental site. Started on amp/gent/clinda and given IV pain medications.  11/09/2018 - Patient reports improvement in malaise, pelvic pain and vaginal bleeding since discharge

## 2018-11-08 NOTE — TELEPHONE ENCOUNTER
----- Message from Kate Gastelum sent at 11/8/2018  9:10 AM CST -----  Contact: Pt   Name of Who is Calling: CHANDLER MELCHOR [[id number]]    What is the request in detail: Pt states she just had a baby two weeks ago and she is experiencing cramping and bleeding and would like to speak with the nurse, please advise.    Can the clinic reply by [facility]: No     What [facility] to [facility] Back [facility]: [phone]

## 2018-11-09 VITALS
DIASTOLIC BLOOD PRESSURE: 57 MMHG | WEIGHT: 161.81 LBS | RESPIRATION RATE: 18 BRPM | OXYGEN SATURATION: 96 % | SYSTOLIC BLOOD PRESSURE: 120 MMHG | HEART RATE: 82 BPM | TEMPERATURE: 98 F | BODY MASS INDEX: 27.77 KG/M2

## 2018-11-09 LAB
BASOPHILS # BLD AUTO: 0.02 K/UL
BASOPHILS NFR BLD: 0.2 %
DIFFERENTIAL METHOD: ABNORMAL
EOSINOPHIL # BLD AUTO: 0.1 K/UL
EOSINOPHIL NFR BLD: 0.5 %
ERYTHROCYTE [DISTWIDTH] IN BLOOD BY AUTOMATED COUNT: 12.5 %
GENTAMICIN TROUGH SERPL-MCNC: 0.7 UG/ML
HCT VFR BLD AUTO: 25.7 %
HGB BLD-MCNC: 8.6 G/DL
LYMPHOCYTES # BLD AUTO: 1.5 K/UL
LYMPHOCYTES NFR BLD: 12.2 %
MCH RBC QN AUTO: 32.2 PG
MCHC RBC AUTO-ENTMCNC: 33.5 G/DL
MCV RBC AUTO: 96 FL
MONOCYTES # BLD AUTO: 0.7 K/UL
MONOCYTES NFR BLD: 5.6 %
NEUTROPHILS # BLD AUTO: 9.9 K/UL
NEUTROPHILS NFR BLD: 81.3 %
PLATELET # BLD AUTO: 213 K/UL
PMV BLD AUTO: 8.9 FL
RBC # BLD AUTO: 2.67 M/UL
WBC # BLD AUTO: 12.16 K/UL

## 2018-11-09 PROCEDURE — 36415 COLL VENOUS BLD VENIPUNCTURE: CPT

## 2018-11-09 PROCEDURE — 63600175 PHARM REV CODE 636 W HCPCS: Performed by: OBSTETRICS & GYNECOLOGY

## 2018-11-09 PROCEDURE — 25000003 PHARM REV CODE 250: Performed by: OBSTETRICS & GYNECOLOGY

## 2018-11-09 PROCEDURE — S0077 INJECTION, CLINDAMYCIN PHOSP: HCPCS | Performed by: STUDENT IN AN ORGANIZED HEALTH CARE EDUCATION/TRAINING PROGRAM

## 2018-11-09 PROCEDURE — 85025 COMPLETE CBC W/AUTO DIFF WBC: CPT

## 2018-11-09 PROCEDURE — 80170 ASSAY OF GENTAMICIN: CPT

## 2018-11-09 PROCEDURE — 25000003 PHARM REV CODE 250: Performed by: STUDENT IN AN ORGANIZED HEALTH CARE EDUCATION/TRAINING PROGRAM

## 2018-11-09 RX ORDER — AMOXICILLIN 500 MG/1
500 CAPSULE ORAL EVERY 12 HOURS
Qty: 20 CAPSULE | Refills: 0 | Status: SHIPPED | OUTPATIENT
Start: 2018-11-09 | End: 2018-11-19

## 2018-11-09 RX ORDER — METRONIDAZOLE 500 MG/1
500 TABLET ORAL EVERY 8 HOURS
Qty: 30 TABLET | Refills: 0 | Status: SHIPPED | OUTPATIENT
Start: 2018-11-09 | End: 2018-11-19

## 2018-11-09 RX ORDER — AMOXICILLIN 500 MG/1
500 CAPSULE ORAL EVERY 12 HOURS
Qty: 20 CAPSULE | Refills: 0 | Status: SHIPPED | OUTPATIENT
Start: 2018-11-09 | End: 2018-11-09

## 2018-11-09 RX ORDER — METRONIDAZOLE 500 MG/1
500 TABLET ORAL EVERY 8 HOURS
Qty: 30 TABLET | Refills: 0 | Status: SHIPPED | OUTPATIENT
Start: 2018-11-09 | End: 2018-11-09

## 2018-11-09 RX ORDER — METRONIDAZOLE 500 MG/1
500 TABLET ORAL EVERY 8 HOURS
Status: DISCONTINUED | OUTPATIENT
Start: 2018-11-09 | End: 2018-11-09 | Stop reason: HOSPADM

## 2018-11-09 RX ORDER — AMOXICILLIN 250 MG/1
500 CAPSULE ORAL EVERY 12 HOURS
Status: DISCONTINUED | OUTPATIENT
Start: 2018-11-09 | End: 2018-11-09 | Stop reason: HOSPADM

## 2018-11-09 RX ADMIN — KETOROLAC TROMETHAMINE 30 MG: 30 INJECTION, SOLUTION INTRAMUSCULAR at 11:11

## 2018-11-09 RX ADMIN — MISOPROSTOL 200 MCG: 200 TABLET ORAL at 10:11

## 2018-11-09 RX ADMIN — AMOXICILLIN 500 MG: 250 CAPSULE ORAL at 10:11

## 2018-11-09 RX ADMIN — MISOPROSTOL 200 MCG: 200 TABLET ORAL at 01:11

## 2018-11-09 RX ADMIN — MISOPROSTOL 200 MCG: 200 TABLET ORAL at 02:11

## 2018-11-09 RX ADMIN — AMPICILLIN SODIUM 2 G: 2 INJECTION, POWDER, FOR SOLUTION INTRAMUSCULAR; INTRAVENOUS at 05:11

## 2018-11-09 RX ADMIN — OXYCODONE HYDROCHLORIDE AND ACETAMINOPHEN 1 TABLET: 5; 325 TABLET ORAL at 05:11

## 2018-11-09 RX ADMIN — MISOPROSTOL 200 MCG: 200 TABLET ORAL at 05:11

## 2018-11-09 RX ADMIN — OXYCODONE HYDROCHLORIDE AND ACETAMINOPHEN 1 TABLET: 5; 325 TABLET ORAL at 02:11

## 2018-11-09 RX ADMIN — METRONIDAZOLE 500 MG: 500 TABLET ORAL at 02:11

## 2018-11-09 RX ADMIN — OXYCODONE HYDROCHLORIDE AND ACETAMINOPHEN 1 TABLET: 5; 325 TABLET ORAL at 10:11

## 2018-11-09 RX ADMIN — CLINDAMYCIN IN 5 PERCENT DEXTROSE 900 MG: 18 INJECTION, SOLUTION INTRAVENOUS at 04:11

## 2018-11-09 NOTE — ED PROVIDER NOTES
"Encounter Date: 2018       History     Chief Complaint   Patient presents with    Vaginal Bleeding    Pelvic Pain     Maria Eugenia Briscoe is a 37 y.o. [id number] who is POD#10 from CS presents complaining of severe abdominal pain and increased vaginal bleeding since this morning.  She reports right lower quadrant abdominal pain that feels deep to the incision. The pain is sharp and constant, and has been gradually increasing since this morning. She reports her bleeding had all but subsided yesterday until this morning, when she had suddenly heavier bleeding soaking through 2 pads in less than an hour and had further bleeding into the toilet. She states "the toilet was full of blood."  This IUP was complicated by h/o CS,  H/o myomectomy and AMA.          Review of patient's allergies indicates:  No Known Allergies  Past Medical History:   Diagnosis Date    Glaucoma     Pregnancy      Past Surgical History:   Procedure Laterality Date     SECTION       SECTION N/A 10/29/2018    Procedure:  SECTION;  Surgeon: Kristen Neumann MD;  [facility]: [facility];  Service: OB/GYN;  Laterality: N/A;     SECTION N/A 10/29/2018    Performed by Kristen [facility] MD at [facility]&    DELIVERY-CEASAREAN SECTION N/A 8/10/2015    Performed by Yemi Arredondo III, MD at [facility]&    MYOMECTOMY      MYOMECTOMY  2010     Family History   Problem Relation Age of Onset    Breast cancer Paternal Aunt     Glaucoma Father     Glaucoma Paternal Grandmother     Colon cancer Neg Hx     Ovarian cancer Neg Hx     Amblyopia Neg Hx     Blindness Neg Hx     Cataracts Neg Hx     Macular degeneration Neg Hx     Retinal detachment Neg Hx     Strabismus Neg Hx      Social History     Tobacco Use    Smoking status: Never Smoker    Smokeless tobacco: Never Used   Substance Use Topics    Alcohol use: No     Comment: rarely    Drug use: No     Review of Systems   Constitutional: Positive for " chills. Negative for fever.   HENT: Negative for congestion.    Eyes: Negative for visual disturbance.   Respiratory: Negative for shortness of breath.    Cardiovascular: Negative for chest pain.   Gastrointestinal: Positive for abdominal pain. Negative for constipation, diarrhea, nausea and vomiting.   Genitourinary: Positive for vaginal bleeding. Negative for dysuria and hematuria.   Musculoskeletal: Negative for back pain.   Skin: Negative for rash.   Neurological: Negative for seizures and headaches.       Physical Exam     Initial Vitals   BP Pulse Resp Temp SpO2   11/08/18 1050 11/08/18 1104 11/08/18 1152 11/08/18 1152 11/08/18 1204   (!) 106/59 78 18 98.9 °F (37.2 °C) 97 %      MAP       --                Physical Exam    Vitals reviewed.  Constitutional: She appears well-developed and well-nourished. She is not diaphoretic. No distress.   HENT:   Head: Normocephalic and atraumatic.   Eyes: EOM are normal.   Neck: Normal range of motion.   Cardiovascular: Normal rate, regular rhythm and normal heart sounds.   Pulmonary/Chest: Breath sounds normal. No respiratory distress.   Abdominal: Soft. There is no rebound and no guarding.   tenderness (Moderate abdominal tenderness to palpation).   Neurological: She is alert and oriented to person, place, and time.   Skin: Skin is warm and dry.   Psychiatric: She has a normal mood and affect. Her behavior is normal. Thought content normal.         ED Course   Procedures  Labs Reviewed   CBC W/ AUTO DIFFERENTIAL - Abnormal; Notable for the following components:       Result Value    WBC 16.21 (*)     RBC 3.76 (*)     Hemoglobin 11.9 (*)     Hematocrit 36.1 (*)     MCH 31.6 (*)     Gran # (ANC) 13.8 (*)     Gran% 84.9 (*)     Lymph% 10.2 (*)     All other components within normal limits   COMPREHENSIVE METABOLIC PANEL - Abnormal; Notable for the following components:    CO2 20 (*)     Albumin 3.3 (*)     All other components within normal limits   TYPE & SCREEN           Imaging Results    None          Medical Decision Making:   ED Management:  Patient complaining of severe abdominal pain since this AM  No associated n/v, problems with BM  VSS, however, WBC 16  Patient reporting significant vaginal bleeding since last night. Unable to tolerate pelvic exam.  Bedside u/s: heterogeneity of EMS likely representing clots and blood, EMS 3cm   Will admit for antibiotics and treatment of likely endometritis  Will treat with goal of clinical improvement of pain, bleeding  Cytotec series started  Other:   I have discussed this case with another health care provider.                   ED Course as of Nov 08 2148   Thu Nov 08, 2018   1037 Patient briefly evaluated; vital signs stable; pulse stable; will get labs, IV and give pain medications. Dried blood noted on her legs; unable to perform pelvic exam yet due to discomfort. POD#11  [AV]   1126 Limited Bedside ultrasound: uterus just below umbilicus; very tender; + 3 cm + of clot/heterogenous material seen in endometrial cavity.  [AV]      ED Course User Index  [AV] Kiki Contreras MD     Clinical Impression:   The encounter diagnosis was Postpartum endometritis.      Disposition:   Disposition: Admitted  Condition: Stable                        Sushma K Reddy, MD  Resident  11/08/18 2597

## 2018-11-09 NOTE — ASSESSMENT & PLAN NOTE
- AM CBC pending  - symptoms grossly improved. Continue serial abdominal exams  - Regular diet. Will make NPO if clinical picture worsens and consider imaging vs D&C  - Continue amp/gent/clind for at least 48 hours from admission  - Remains afebrile, normal sinus, continue vital signs per floor  - Pad counts  - complete cytotec series  - pain management with norco and toradol prn and dilaudid for btp

## 2018-11-09 NOTE — PROGRESS NOTES
"[facility]  Obstetrics & Gynecology  Progress Note    Patient Name: Maria Eugenia Briscoe  MRN: [id number]  Admission Date: 2018  Primary Care Provider: Cortney Talbert MD  Principal Problem: Endometritis    Subjective:     HPI:  Maria Eugenia Briscoe is a 37 y.o. [id number] who is POD#9 from CS presents complaining of severe abdominal pain and increased vaginal bleeding since this morning.  She reports right lower quadrant abdominal pain that feels deep to the incision. The pain is sharp and constant, and has been gradually increasing since this morning. She reports her bleeding had all but subsided yesterday until this morning, when she had suddenly heavier bleeding soaking through 2 pads in less than an hour and had further bleeding into the toilet. She states "the toilet was full of blood."  This IUP was complicated by H/o myomectomy and AMA.    Interval History: Patient reports overall improvement. Denies fever, n/v. Malaise and pain have improved. Patient has appetite. She reports discharged has lessened, no vaginal bleeding. She does report pain 4/10 and relived with percocet. She is pumping for her baby.     Scheduled Meds:   ampicillin IVPB  2 g Intravenous Q6H    clindamycin (CLEOCIN) IVPB  900 mg Intravenous Q8H    gentamicin  1.5 mg/kg Intravenous Q8H    miSOPROStol  200 mcg Oral Q4H     Continuous Infusions:   lactated ringers 75 mL/hr at 18 1311     PRN Meds:ketorolac, ondansetron, oxyCODONE-acetaminophen, oxyCODONE-acetaminophen, promethazine (PHENERGAN) IVPB, promethazine (PHENERGAN) IVPB, sodium chloride 0.9%    Review of patient's allergies indicates:  No Known Allergies    Objective:     Vital Signs (Most Recent):  Temp: 99.3 °F (37.4 °C) (18)  Pulse: 93 (18)  Resp: 18 (18)  BP: 127/77 (18)  SpO2: 100 % (18) Vital Signs (24h Range):  Temp:  [98.3 °F (36.8 °C)-99.4 °F (37.4 °C)] 99.3 °F (37.4 °C)  Pulse:  [70-95] " 93  Resp:  [18] 18  SpO2:  [95 %-100 %] 100 %  BP: (106-140)/(58-90) 127/77     Weight: 73.4 kg (161 lb 12.8 oz)  Body mass index is 27.77 kg/m².  No LMP recorded.    I&O (Last 24H):  No intake or output data in the 24 hours ending 11/09/18 0618    Physical Exam:   Constitutional: She is oriented to person, place, and time. She appears well-developed and well-nourished. No distress.    HENT:   Head: Normocephalic and atraumatic.    Eyes: Conjunctivae are normal. Right eye exhibits no discharge. Left eye exhibits no discharge.    Neck: Neck supple. No tracheal deviation present.    Cardiovascular: Normal rate and regular rhythm.     Pulmonary/Chest: Effort normal.        Abdominal: Soft. There is tenderness.   Fundal tenderness, decreased                 Neurological: She is alert and oriented to person, place, and time.    Skin: She is not diaphoretic.    Psychiatric: She has a normal mood and affect. Her behavior is normal. Thought content normal.       Laboratory:  Recent Lab Results       11/08/18  1754   11/08/18  1059        Albumin   3.3     Alkaline Phosphatase   82     ALT   21     Anion Gap   14     AST   24     Baso #   0.04     Basophil%   0.2     Total Bilirubin   0.6  Comment:  For infants and newborns, interpretation of results should be based  on gestational age, weight and in agreement with clinical  observations.  Premature Infant recommended reference ranges:  Up to 24 hours.............<8.0 mg/dL  Up to 48 hours............<12.0 mg/dL  3-5 days..................<15.0 mg/dL  6-29 days.................<15.0 mg/dL       BUN, Bld   11     Calcium   9.2     Chloride   104     CO2   20     Creatinine   0.6     Differential Method   Automated     eGFR if    >60     eGFR if non    >60  Comment:  Calculation used to obtain the estimated glomerular filtration  rate (eGFR) is the CKD-EPI equation.        Eos #   0.1     Eosinophil%   0.3     Gentamicin, Peak 3.9       Glucose    80     Gran # (ANC)   13.8     Gran%   84.9     Group & Rh   A POS     Hematocrit   36.1     Hemoglobin   11.9     INDIRECT LUCILLE   NEG     Lymph #   1.7     Lymph%   10.2     MCH   31.6     MCHC   33.0     MCV   96     Mono #   0.7     Mono%   4.2     MPV   9.5     Platelets   341     Potassium   3.5     Total Protein   8.4     RBC   3.76     RDW   12.4     Sodium   138     WBC   16.21           Diagnostic Results:  Labs: Reviewed    Assessment/Plan:     * Endometritis    - AM CBC pending  - symptoms grossly improved. Continue serial abdominal exams  - Regular diet. Will make NPO if clinical picture worsens and consider imaging vs D&C  - Continue amp/gent/clind for at least 48 hours from admission  - Remains afebrile, normal sinus, continue vital signs per floor  - Pad counts  - complete cytotec series  - pain management with norco and toradol prn and dilaudid for btp         Yaya Torrez MD  Obstetrics & Gynecology  [facility]

## 2018-11-09 NOTE — DISCHARGE SUMMARY
Delivery Discharge Summary  Obstetrics      Primary OB Clinician: Kristen Neumann MD      Admission date: 2018  Discharge date: 2018    Disposition: To home, self care    Discharge Diagnosis List:      Patient Active Problem List   Diagnosis    History of myomectomy    S/P     Leakage of amniotic fluid    Comfort measures only status    Endometritis    Postpartum endometritis       Hospital Course:  Maria Eugenia Briscoe is a 37 y.o. who was admitted at POD#9 s/p 1LTCS for postpartum endometritis.  She was started on a regimen of ampicillin, gentamicin, and clindamycin and pain was managed with norco and toradol prn. On Hospital Day #2, patient reports marked improvement in pain and bleeding. She will be discharged with 10 day course of amoxicillin and flagyl.    On discharge day, patient's pain is controlled with oral pain medications. Pt is tolerating ambulation without SOB or CP, and regular diet without N/V. Reports lochia is mild. Denies any HA, vision changes, F/C, LE swelling. Denies any breast pain/soreness.    Pt in stable condition and ready for discharge. She has been instructed to start and/or continue medications and follow up with her obstetrics provider as listed below.    Pertinent studies:  Postpartum CBC  Lab Results   Component Value Date    WBC 12.16 2018    HGB 8.6 (L) 2018    HCT 25.7 (L) 2018    MCV 96 2018     2018       Immunization History   Administered Date(s) Administered    Influenza - Quadrivalent - PF 10/18/2018    Tdap 2015, 2018        This patient has no babies on file.    Patient Instructions:   Current Discharge Medication List      START taking these medications    Details   amoxicillin (AMOXIL) 500 MG capsule Take 1 capsule (500 mg total) by mouth every 12 (twelve) hours. for 10 days  Qty: 20 capsule, Refills: 0      metroNIDAZOLE (FLAGYL) 500 MG tablet Take 1 tablet (500 mg total) by mouth every  8 (eight) hours. for 10 days  Qty: 30 tablet, Refills: 0         CONTINUE these medications which have NOT CHANGED    Details   ibuprofen (ADVIL,MOTRIN) 600 MG tablet Take 1 tablet (600 mg total) by mouth every 6 (six) hours.  Qty: 30 tablet, Refills: 1      oxyCODONE-acetaminophen (PERCOCET) 5-325 mg per tablet Take 1 tablet by mouth every 4 (four) hours as needed.  Qty: 15 tablet, Refills: 0      PNV72-iron carb,glu-FA-dss-dha (CITRANATAL 90 DHA, ALGAL OIL,) 90 mg iron-1 mg -50 mg-300 mg Cmpk Take 1 tablet by mouth once daily.  Qty: 30 each, Refills: 11    Associated Diagnoses: Amenorrhea; Positive pregnancy test             Discharge Procedure Orders   Diet Adult Regular     Notify your health care provider if you experience any of the following:   Order Comments: Heavy vaginal bleeding saturating more than 1 pad per hr for at least consecutive 2 hrs.     Notify your health care provider if you experience any of the following:  increased confusion or weakness     Notify your health care provider if you experience any of the following:  persistent dizziness, light-headedness, or visual disturbances     Notify your health care provider if you experience any of the following:  severe persistent headache     Notify your health care provider if you experience any of the following:  difficulty breathing or increased cough     Notify your health care provider if you experience any of the following:  severe uncontrolled pain     Notify your health care provider if you experience any of the following:  temperature >100.4     Notify your health care provider if you experience any of the following:  persistent nausea and vomiting or diarrhea     Activity as tolerated   Order Comments: Pelvic rest until follow up visit. Nothing in vagina -no sex, tampons, douching, etc.            Catrina Hudson MD  OBGYN, PGY-1

## 2018-11-10 LAB — BACTERIA UR CULT: NORMAL

## 2018-11-18 ENCOUNTER — PATIENT MESSAGE (OUTPATIENT)
Dept: OBSTETRICS AND GYNECOLOGY | Facility: CLINIC | Age: 38
End: 2018-11-18

## 2018-11-26 ENCOUNTER — OFFICE VISIT (OUTPATIENT)
Dept: OBSTETRICS AND GYNECOLOGY | Facility: CLINIC | Age: 38
End: 2018-11-26
Payer: COMMERCIAL

## 2018-11-26 VITALS
WEIGHT: 161.38 LBS | DIASTOLIC BLOOD PRESSURE: 74 MMHG | BODY MASS INDEX: 27.55 KG/M2 | SYSTOLIC BLOOD PRESSURE: 118 MMHG | HEIGHT: 64 IN

## 2018-11-26 PROCEDURE — 99999 PR PBB SHADOW E&M-EST. PATIENT-LVL III: CPT | Mod: PBBFAC,,, | Performed by: NURSE PRACTITIONER

## 2018-11-26 PROCEDURE — 0503F POSTPARTUM CARE VISIT: CPT | Mod: S$GLB,,, | Performed by: NURSE PRACTITIONER

## 2018-11-26 NOTE — PROGRESS NOTES
Postpartum Visit  Maria Eugenia Briscoe is a 38 y.o. female  is here for a postpartum follow up. She is 4 weeks postpartum following a repeat low cervical transverse  section, of a male infant at 36w2d. She was seen in the ED on 18 with pain and increased bleeding, diagnosed with endometritis and s/p antibiotics. Feeling much better today. Occ incisional aches and pain, nothing consistent or severe. Only taking prenatal vitamins. Denies fever. Bleeding much lighter. Breast and formula feeding.     Pregnancy was complicated by: hx of myomectomy, prior c/s, PP endometritis, AMA, PPROM.    OB History    Para Term  AB Living   3 2 1 1 1 2   SAB TAB Ectopic Multiple Live Births   1     0 2      # Outcome Date GA Lbr Patrick/2nd Weight Sex Delivery Anes PTL Lv   3  10/29/18 36w2d  2.5 kg (5 lb 8.2 oz) M CS-LTranv Spinal Y PANDA   2 Term 08/10/15 39w3d  3.05 kg (6 lb 11.6 oz) M CS-LTranv Spinal N PANDA   1 SAB  6w0d                 Postpartum course has been uncomplicated.  Bleeding no bleeding. Bowel/ bladder function is normal.     Baby's course has been uncomplicated. Baby is feeding by both breast and bottle.    ROS:  GENERAL: No fever, chills, fatigability.  VULVAR: No pain, no lesions and no itching.  VAGINAL: No relaxation, no itching, no discharge, no abnormal bleeding and no lesions.  ABDOMEN: No abdominal pain. Denies nausea. Denies vomiting. No diarrhea. No constipation  BREAST: Denies pain. No lumps. No discharge.  URINARY: No incontinence, no nocturia, no frequency and no dysuria.  CARDIOVASCULAR: No chest pain. No shortness of breath. No leg cramps.  NEUROLOGICAL: No headaches. No vision changes.      General appearance - alert, well appearing, and in no distress, oriented to person, place, and time and normal appearing weight  Mental status - alert, oriented to person, place, and time, normal mood, behavior, speech, dress, motor activity, and thought processes  Skin -  coloration normal for race, good turgor, warm to touch, no rashes  Abdomen - soft, nontender, nondistended, no masses or organomegaly  Pfannenstiel incision: Clean, dry, intact - healing well.  Pelvic - pt declined, has difficulty tolerating vaginal exams. Precautions reviewed  Extremities - no edema, redness or tenderness in the calves or thighs      Diagnoses and all orders for this visit:    Postpartum endometritis    afebrile today, 98.4  Counseling regarding resuming normal activities of exercise and work.  Postpartum precautions reviewed    F/U 12/10

## 2018-12-10 ENCOUNTER — PATIENT MESSAGE (OUTPATIENT)
Dept: OBSTETRICS AND GYNECOLOGY | Facility: CLINIC | Age: 38
End: 2018-12-10

## 2018-12-13 ENCOUNTER — PATIENT MESSAGE (OUTPATIENT)
Dept: OBSTETRICS AND GYNECOLOGY | Facility: CLINIC | Age: 38
End: 2018-12-13

## 2018-12-14 ENCOUNTER — POSTPARTUM VISIT (OUTPATIENT)
Dept: OBSTETRICS AND GYNECOLOGY | Facility: CLINIC | Age: 38
End: 2018-12-14
Attending: OBSTETRICS & GYNECOLOGY
Payer: COMMERCIAL

## 2018-12-14 VITALS
SYSTOLIC BLOOD PRESSURE: 148 MMHG | BODY MASS INDEX: 28.19 KG/M2 | HEIGHT: 64 IN | DIASTOLIC BLOOD PRESSURE: 94 MMHG | WEIGHT: 165.13 LBS

## 2018-12-14 DIAGNOSIS — Z30.017 NEXPLANON INSERTION: ICD-10-CM

## 2018-12-14 DIAGNOSIS — Z97.5 NEXPLANON IN PLACE: ICD-10-CM

## 2018-12-14 PROBLEM — Z98.890 HISTORY OF MYOMECTOMY: Status: RESOLVED | Noted: 2018-04-16 | Resolved: 2018-12-14

## 2018-12-14 PROBLEM — Z98.891 S/P C-SECTION: Status: RESOLVED | Noted: 2018-10-29 | Resolved: 2018-12-14

## 2018-12-14 PROBLEM — Z51.5 COMFORT MEASURES ONLY STATUS: Status: RESOLVED | Noted: 2018-11-02 | Resolved: 2018-12-14

## 2018-12-14 PROBLEM — N71.9 ENDOMETRITIS: Status: RESOLVED | Noted: 2018-11-08 | Resolved: 2018-12-14

## 2018-12-14 PROBLEM — O42.90 LEAKAGE OF AMNIOTIC FLUID: Status: RESOLVED | Noted: 2018-10-29 | Resolved: 2018-12-14

## 2018-12-14 PROCEDURE — 0503F POSTPARTUM CARE VISIT: CPT | Mod: S$GLB,,, | Performed by: OBSTETRICS & GYNECOLOGY

## 2018-12-14 PROCEDURE — 99999 PR PBB SHADOW E&M-EST. PATIENT-LVL III: CPT | Mod: PBBFAC,,, | Performed by: OBSTETRICS & GYNECOLOGY

## 2018-12-14 NOTE — PROGRESS NOTES
"CC: Post-partum follow-up    Maria Eugenia Briscoe is a 38 y.o. female  who presents for post-partum visit.  She is S/P a , due to h/o C/S, myomectomy and ROM.  She and the baby are doing well.  No pain.  No fever.   No bowel / bladder complaints.    Delivery Date: 2018  Delivery MD: Dr. Kristen Neumann  Gender: male  Birth Weight: 2500 grams  Breast Feeding: YES  Depression: NO  Contraception: Nexplanon    Pregnancy was complicated by:  1. AMA  2. Gestational hypertension  3. History of myomectomy  4. History of   5. Postpartum endometritis    BP (!) 148/94 (BP Location: Left arm, Patient Position: Sitting, BP Method: Large (Manual))   Ht 5' 4" (1.626 m)   Wt 74.9 kg (165 lb 2 oz)   Breastfeeding? Yes   BMI 28.34 kg/m²     ROS:  GENERAL: No fever, chills, fatigability.  VULVAR: No pain, no lesions and no itching.  VAGINAL: No relaxation, no itching, no discharge, no abnormal bleeding and no lesions.  ABDOMEN: No abdominal pain. Denies nausea. Denies vomiting. No diarrhea. No constipation  BREAST: Denies pain. No lumps. No discharge.  URINARY: No incontinence, no nocturia, no frequency and no dysuria.  CARDIOVASCULAR: No chest pain. No shortness of breath. No leg cramps.  NEUROLOGICAL: No headaches. No vision changes.    PHYSICAL EXAM:  INCISION: Clean, dry, intact.  Well healed.    ABDOMEN:  Soft, non-tender, non-distended  VULVA:  Normal, no lesions  CERVIX:  Without lesions, polyps or tenderness.  UTERUS:  Normal size, shape, consistency, no mass or tenderness.  ADNEXA:  Normal in size without mass or tenderness    IMP:  Doing well S/P , due to history of myomectomy, history of   Instructions / precautions reviewed  Contraceptive counseling      PLAN:  May resume normal activities    RTC in 2 weeks for Nexplanon placement    Follow-up in about 2 weeks (around 2018).          "

## 2018-12-17 ENCOUNTER — RESEARCH ENCOUNTER (OUTPATIENT)
Dept: RESEARCH | Facility: HOSPITAL | Age: 38
End: 2018-12-17

## 2018-12-17 NOTE — PROGRESS NOTES
"Spoke with Ms. Silverman  per telephone for TXA study 6 wk follow up survey.  Denies thromboembolism, stroke, M.I., seizure, surgical site infections, pelvic adscess or hospital readmission greater than 48 hours since discharge.  Pt. Stated she had been hospitalized for less than 48 hrs on POD#10 for "uterine infection" . Postpartal visit records reviewed and diagnosis of endometritis was confirmed on admit notes on 11/08/2018.  Patient was treated with antibiotics, made marked improvement and was discharged home with po antibiotics where she recovered completely.  $20 added to subject's clincard per study procedure for reimbursement time in study.  "

## 2019-01-10 ENCOUNTER — PATIENT MESSAGE (OUTPATIENT)
Dept: OBSTETRICS AND GYNECOLOGY | Facility: CLINIC | Age: 39
End: 2019-01-10

## 2019-01-14 ENCOUNTER — PROCEDURE VISIT (OUTPATIENT)
Dept: OBSTETRICS AND GYNECOLOGY | Facility: CLINIC | Age: 39
End: 2019-01-14
Attending: OBSTETRICS & GYNECOLOGY
Payer: COMMERCIAL

## 2019-01-14 VITALS
SYSTOLIC BLOOD PRESSURE: 116 MMHG | DIASTOLIC BLOOD PRESSURE: 74 MMHG | HEIGHT: 64 IN | WEIGHT: 158.5 LBS | BODY MASS INDEX: 27.06 KG/M2

## 2019-01-14 DIAGNOSIS — Z30.017 NEXPLANON INSERTION: Primary | ICD-10-CM

## 2019-01-14 LAB
B-HCG UR QL: NEGATIVE
CTP QC/QA: YES

## 2019-01-14 PROCEDURE — 81025 POCT URINE PREGNANCY: ICD-10-PCS | Mod: S$GLB,,, | Performed by: OBSTETRICS & GYNECOLOGY

## 2019-01-14 PROCEDURE — 11981 PR INSERT, DRUG DELIVERY IMPLANT, BIORESORB/BIODEGR/NON-BIODEGR: ICD-10-PCS | Mod: S$GLB,,, | Performed by: OBSTETRICS & GYNECOLOGY

## 2019-01-14 PROCEDURE — 81025 URINE PREGNANCY TEST: CPT | Mod: S$GLB,,, | Performed by: OBSTETRICS & GYNECOLOGY

## 2019-01-14 PROCEDURE — 11981 INSERTION DRUG DLVR IMPLANT: CPT | Mod: S$GLB,,, | Performed by: OBSTETRICS & GYNECOLOGY

## 2019-01-14 NOTE — PROCEDURES
CC: NEXPLANON INSERTION:      PRE-NEXPLANON INSERTION COUNSELING:  All contraceptive options were reviewed and the patient chooses Nexplanon.  Patients history was reviewed and there were no contraindications to Nexplanon.  The procedure and minimal risks of pain, bleeding, bruising and infection at the insertion site discussed. Possible irregular menstrual bleeding pattern versus amenorrhea was explained.  No protection against STDs discussed.  Written information provided; all questions answered and patient agrees to proceed.  Consent signed and scanned into computer.    EXAM:  With patient in supine position the nondominant arm was flexed at the elbow and externally rotated.  The insertion site was identified 6-8 cm above the elbow crease at the inner side of the upper arm overlying the groove between biceps and triceps.  The insertion site was marked and a second tammy was placed 6-8 cm above the first.    PROCEDURE:  TIME OUT PERFORMED.  The insertion site was prepped with antiseptic and injected with 3 cc of 1% Xylocaine without epinephrine subq along the planned insertion canal.  Xylocaine subq along the planned insertion canal.  Using sterile technique the Nexplanon applicator was visually verified and removed from the blister pack.  The base of the applicator was gently tapped with the needle pointed up until the implant disappeared back into the needle and the needle cap was removed.  The needle tip was inserted bevel side up at a 20 degree angle to penetrate the skin.  The applicator was lowered parallel to the arm and the skin was tented with the needle.  The seal of the applicator was broken by pressing the obturator support and turned 90degrees.  The obturator tip was fixed in place on the arm with one hand and with the other hand the needle was slowly and fully retracted back along full length of the obturator.  The grooved tip of the obturator was visible inside the needle and the implant waspalpable  after insertion.  A small adhesive bandage and then a pressure bandage was placed over the insertion site.  The patient tolerated the procedure well.    ASSESSMENT:  1. Contraception management / Nexplanon insertion.    POST IMPLANON INSERTION COUNSELING:  Manage post Implanon placement arm pain with NSAIDs, Tylenol or Rx per MedCard.  Keep arm elevated and apply intermittent ice packs to decrease pain and bruising for 24 Hours.  May remove bandage in 24 hours.  Nexplanon danger signs (worsening pain at insertion site, bleeding through bandage, redness and/or pus drainage at insertion site).  Removal in 3 years.    Counseling lasted approximately 15 minutes and all her questions were answered.    FOLLOW-UP: with me in two weeks.

## 2019-05-09 ENCOUNTER — PATIENT MESSAGE (OUTPATIENT)
Dept: OBSTETRICS AND GYNECOLOGY | Facility: CLINIC | Age: 39
End: 2019-05-09

## 2019-05-10 ENCOUNTER — POSTPARTUM VISIT (OUTPATIENT)
Dept: OBSTETRICS AND GYNECOLOGY | Facility: CLINIC | Age: 39
End: 2019-05-10
Payer: COMMERCIAL

## 2019-05-10 ENCOUNTER — TELEPHONE (OUTPATIENT)
Dept: OBSTETRICS AND GYNECOLOGY | Facility: CLINIC | Age: 39
End: 2019-05-10

## 2019-05-10 VITALS
WEIGHT: 155.19 LBS | BODY MASS INDEX: 26.49 KG/M2 | SYSTOLIC BLOOD PRESSURE: 118 MMHG | HEIGHT: 64 IN | DIASTOLIC BLOOD PRESSURE: 72 MMHG

## 2019-05-10 PROCEDURE — 99999 PR PBB SHADOW E&M-EST. PATIENT-LVL II: CPT | Mod: PBBFAC,,, | Performed by: OBSTETRICS & GYNECOLOGY

## 2019-05-10 PROCEDURE — 99999 PR PBB SHADOW E&M-EST. PATIENT-LVL II: ICD-10-PCS | Mod: PBBFAC,,, | Performed by: OBSTETRICS & GYNECOLOGY

## 2019-05-10 RX ORDER — SERTRALINE HYDROCHLORIDE 25 MG/1
TABLET, FILM COATED ORAL
Qty: 60 TABLET | Refills: 6 | Status: SHIPPED | OUTPATIENT
Start: 2019-05-10 | End: 2019-06-05 | Stop reason: SDUPTHER

## 2019-05-10 NOTE — TELEPHONE ENCOUNTER
Contacted the patient regarding the e-mail she sent through the portal. Patient denied feeling as she described in the e-mail at the moment. Patient denied any suicidal thoughts are thoughts of harming her children at the time of the call. Patient instructed to report to the ER if she has those thoughts again per Dr. Granados. Patient agreed to an appointment on 5/10 at 2:15PM with Dr. CHELY Gonzalez to discuss pp depression. Patient instructed to contact the clinic with any questions or concerns. Patient verbalized understanding.

## 2019-05-10 NOTE — TELEPHONE ENCOUNTER
----- Message from Michelle Fong MA sent at 5/10/2019  2:57 PM CDT -----  Contact: Lisa with [facility]  Lisa states that the pts insurance will not cover the  (ZOLOFT) 25 MG tablet and is requesting a Rx for 50 MG tablet with instructions to take 1/2 tablet a day for 7 days then increase to 1 tablet a day.  Lisa can be reached at [phone].  Thanks [place]

## 2019-05-11 NOTE — PROGRESS NOTES
"CC: FU visit    Maria Eugenia Briscoe is a 38 y.o. female  who presents for fu visit. Has two children, youngest was delivered in 2018. She has Nexplanon in place. Here today to discuss mood symptoms. Since her oldest was born, several years ago, she has had intrusive thoughts of hurting her children. She would never actually hurt them and has no plans to hurt them, but sometimes she has this feeling. It goes away as quickly as it comes. She has previously thought about hurting herself, or at least wondering what it would be like, but has never had a plan. She feels like she should get help. Her  is very supportive.      ROS:  GENERAL: Denies fevers, chills  VAGINAL: Denies abnormal discharge, heavy bleeding  ABDOMEN: Denies abdominal pain, constipation, diarrhea  BREAST: Denies pain.   URINARY: Denies urgency, frequency, incontinence   CARDIOVASCULAR: Denies chest pain, shortness of breath.  NEUROLOGICAL: Denies headaches, vision changes    PHYSICAL EXAM:  /72   Ht 5' 4" (1.626 m)   Wt 70.4 kg (155 lb 3.3 oz)   LMP  (LMP Unknown)   BMI 26.64 kg/m²   GEN: No apparent distress  CV: Regular rate  PULM: No increased work of breathing  PELVIC: Deferred    EPDS 11     ASSESSMENT: Here for fu visit. Has mood disorder with intrusive thoughts of hurting her children. She has never and would never actually hurt her children. After lengthy discussion with patient, she has incredible insight to her situation. Her symptoms have been going on for years, but she would like to seek help. We discussed going to ER, but this is not an acute event per patient, so will manage as outpatient.     1. Rx for zoloft sent, start with 25 mg daily and increase to 50 mg daily after 1 week. Risks/side effects reviewed.  2. Strongly advised behavioral therapy and list of providers given to patient for support.  3. May need referral to psychiatry pending her symptoms, but she would like to see how she does on meds " and with talk therapy first.   4. We briefly discussed removing Nexplanon, but this can be last resort as symptoms don't seem related.    FU with myself or Dr. Weeks in 3-4 weeks.    Coni Gonzalez MD  Obstetrics and Gynecology  [facility]

## 2019-06-05 ENCOUNTER — POSTPARTUM VISIT (OUTPATIENT)
Dept: OBSTETRICS AND GYNECOLOGY | Facility: CLINIC | Age: 39
End: 2019-06-05
Attending: OBSTETRICS & GYNECOLOGY
Payer: COMMERCIAL

## 2019-06-05 VITALS
BODY MASS INDEX: 26.42 KG/M2 | HEIGHT: 64 IN | SYSTOLIC BLOOD PRESSURE: 108 MMHG | WEIGHT: 154.75 LBS | DIASTOLIC BLOOD PRESSURE: 60 MMHG

## 2019-06-05 PROCEDURE — 99214 OFFICE O/P EST MOD 30 MIN: CPT | Mod: S$GLB,,, | Performed by: OBSTETRICS & GYNECOLOGY

## 2019-06-05 PROCEDURE — 3008F BODY MASS INDEX DOCD: CPT | Mod: CPTII,S$GLB,, | Performed by: OBSTETRICS & GYNECOLOGY

## 2019-06-05 PROCEDURE — 3008F PR BODY MASS INDEX (BMI) DOCUMENTED: ICD-10-PCS | Mod: CPTII,S$GLB,, | Performed by: OBSTETRICS & GYNECOLOGY

## 2019-06-05 PROCEDURE — 99999 PR PBB SHADOW E&M-EST. PATIENT-LVL III: ICD-10-PCS | Mod: PBBFAC,,, | Performed by: OBSTETRICS & GYNECOLOGY

## 2019-06-05 PROCEDURE — 99214 PR OFFICE/OUTPT VISIT, EST, LEVL IV, 30-39 MIN: ICD-10-PCS | Mod: S$GLB,,, | Performed by: OBSTETRICS & GYNECOLOGY

## 2019-06-05 PROCEDURE — 99999 PR PBB SHADOW E&M-EST. PATIENT-LVL III: CPT | Mod: PBBFAC,,, | Performed by: OBSTETRICS & GYNECOLOGY

## 2019-06-05 RX ORDER — SERTRALINE HYDROCHLORIDE 50 MG/1
TABLET, FILM COATED ORAL
Refills: 6 | COMMUNITY
Start: 2019-05-10 | End: 2019-06-05 | Stop reason: ALTCHOICE

## 2019-06-05 RX ORDER — ESCITALOPRAM OXALATE 10 MG/1
20 TABLET ORAL DAILY
Qty: 60 TABLET | Refills: 2 | Status: SHIPPED | OUTPATIENT
Start: 2019-06-05 | End: 2020-05-28

## 2019-06-05 NOTE — PROGRESS NOTES
"Chief complaint: postpartum depression    Maria Eugenia Briscoe is a 38 y.o. female  presents with complaint of postpartum depression    She reports feeling a lot of anxiety since the birth of her last son.  She reports not having a lot of help.  Her  works out of town and they do not have family here.  She reports thoughts of hurting her children but does not have a plan.  She reports improvement in those thoughts with Zoloft but still having a lot of anxiety.  She reports that she is in therapy and also feeling better.      /60   Ht 5' 4" (1.626 m)   Wt 70.2 kg (154 lb 12.2 oz)   LMP 2019   Breastfeeding? No   BMI 26.57 kg/m²     ROS:  GENERAL: No fever, chills, fatigability or weight loss.  VULVAR: No pain, no lesions and no itching.  VAGINAL: No relaxation, no itching, no discharge, no abnormal bleeding and no lesions.  ABDOMEN: No abdominal pain. Denies nausea. Denies vomiting. No diarrhea. No constipation  BREAST: Denies pain. No lumps. No discharge.  URINARY: No incontinence, no nocturia, no frequency and no dysuria.  CARDIOVASCULAR: No chest pain. No shortness of breath. No leg cramps.  NEUROLOGICAL: No headaches. No vision changes.  PSYCHOLOGY: Denies homicidal/suicidal ideation.    Physical exam:  Deferred    1. Postpartum depression  escitalopram oxalate (LEXAPRO) 10 MG tablet       Discussed causes of PPD.  Recommended Lexapro as it has a component to treat depression and anxiety.  Patient denies HI/SI.  Discussed the risk and that she should go to the ED if the symptoms return.      RTC in 2 weeks to re-evaluate symptoms.  "

## 2019-06-14 ENCOUNTER — TELEPHONE (OUTPATIENT)
Dept: OBSTETRICS AND GYNECOLOGY | Facility: CLINIC | Age: 39
End: 2019-06-14

## 2019-06-17 ENCOUNTER — POSTPARTUM VISIT (OUTPATIENT)
Dept: OBSTETRICS AND GYNECOLOGY | Facility: CLINIC | Age: 39
End: 2019-06-17
Attending: OBSTETRICS & GYNECOLOGY
Payer: COMMERCIAL

## 2019-06-17 VITALS
WEIGHT: 157.19 LBS | SYSTOLIC BLOOD PRESSURE: 122 MMHG | DIASTOLIC BLOOD PRESSURE: 84 MMHG | HEIGHT: 64 IN | BODY MASS INDEX: 26.84 KG/M2

## 2019-06-17 PROCEDURE — 99999 PR PBB SHADOW E&M-EST. PATIENT-LVL III: CPT | Mod: PBBFAC,,, | Performed by: OBSTETRICS & GYNECOLOGY

## 2019-06-17 PROCEDURE — 99213 PR OFFICE/OUTPT VISIT, EST, LEVL III, 20-29 MIN: ICD-10-PCS | Mod: S$GLB,,, | Performed by: OBSTETRICS & GYNECOLOGY

## 2019-06-17 PROCEDURE — 99213 OFFICE O/P EST LOW 20 MIN: CPT | Mod: S$GLB,,, | Performed by: OBSTETRICS & GYNECOLOGY

## 2019-06-17 PROCEDURE — 99999 PR PBB SHADOW E&M-EST. PATIENT-LVL III: ICD-10-PCS | Mod: PBBFAC,,, | Performed by: OBSTETRICS & GYNECOLOGY

## 2019-06-17 PROCEDURE — 3008F BODY MASS INDEX DOCD: CPT | Mod: CPTII,S$GLB,, | Performed by: OBSTETRICS & GYNECOLOGY

## 2019-06-17 PROCEDURE — 3008F PR BODY MASS INDEX (BMI) DOCUMENTED: ICD-10-PCS | Mod: CPTII,S$GLB,, | Performed by: OBSTETRICS & GYNECOLOGY

## 2019-06-17 NOTE — PROGRESS NOTES
"Chief complaint: Follow-up postpartum depression    Chief complaint: postpartum depression     Maria Eugenia Briscoe is a 38 y.o. female  presents with complaint of postpartum depression     She reports feeling a lot of anxiety since the birth of her last son.  She reports not having a lot of help.  Her  works out of town and they do not have family here.  She reports thoughts of hurting her children but does not have a plan.  She reports improvement in those thoughts with Zoloft but still having a lot of anxiety.  She reports that she is in therapy and also feeling better.      Because of the continued anxiety on Zoloft, this was discontinued and she was started on Lexapro.      She reports improvement in anxiety, negative thoughts, and depression.  She does report some "sluggishness" in the morning.  She is taking the Lexapro at 8 pm     /60   Ht 5' 4" (1.626 m)   Wt 70.2 kg (154 lb 12.2 oz)   LMP 2019   Breastfeeding? No   BMI 26.57 kg/m²      ROS:  GENERAL: No fever, chills, fatigability or weight loss.  VULVAR: No pain, no lesions and no itching.  VAGINAL: No relaxation, no itching, no discharge, no abnormal bleeding and no lesions.  ABDOMEN: No abdominal pain. Denies nausea. Denies vomiting. No diarrhea. No constipation  BREAST: Denies pain. No lumps. No discharge.  URINARY: No incontinence, no nocturia, no frequency and no dysuria.  CARDIOVASCULAR: No chest pain. No shortness of breath. No leg cramps.  NEUROLOGICAL: No headaches. No vision changes.  PSYCHOLOGY: Denies homicidal/suicidal ideation.     Physical exam:  Deferred     1. Postpartum depression  escitalopram oxalate (LEXAPRO) 10 MG tablet         Discussed causes of PPD.  Recommended taking the lexapro at 6 p to help with fatigue.  Patient denies HI/SI.  Discussed the risk and that she should go to the ED if the symptoms return.       RTC in 6 months to re-evaluate symptoms.    "

## 2019-06-20 ENCOUNTER — PATIENT MESSAGE (OUTPATIENT)
Dept: OBSTETRICS AND GYNECOLOGY | Facility: CLINIC | Age: 39
End: 2019-06-20

## 2019-06-24 ENCOUNTER — PATIENT MESSAGE (OUTPATIENT)
Dept: OBSTETRICS AND GYNECOLOGY | Facility: CLINIC | Age: 39
End: 2019-06-24

## 2020-01-26 NOTE — PROGRESS NOTES
HPI     Glaucoma      Additional comments: HVF and OCT review               Comments     DLS : 8/5/18    1. POAG  2. Large CDR    MEDS:  Latanoprost QHS OU = PT HAS BEEN OUT OF DROPS                      Last edited by Tammi Aldana MA on 1/27/2020  3:31 PM. (History)            Assessment /Plan     For exam results, see Encounter Report.    Primary open-angle glaucoma, bilateral, mild stage    Optic cupping of both eyes    Optic disc anomaly    Family history of glaucoma          PT WAS SEEN HERE AT [facility] - (PRE 2004) - AS A GLAUCOMA SUSPECT AT ABOUT 14 YEARS OF AGE  NO TREATMENT WAS STARTED AT THAT TIME   SHE RE-PRESENTS AS A GLAUCOMA SUSPECT 8/14/2017   SHE IS NOW 36 AND HAS A 2 YEAR OLD CHILD      1. Glaucoma suspect 2/2 large CDR   Seen as a teenager ( about age 14 ) - and there  May be some old slides in storage     Re-presents as continued suspect        Glaucoma (type and duration)    Followed as a supspect since 1999 (age 14)   First HVF   1999   First photos   ?   Treatment / Drops started   Drops starte 12/2017 at age 37           Family history    + grandmother         Glaucoma meds    Latanoprost started 12/2017        H/O adverse rxn to glaucoma drops    none        LASERS    none        GLAUCOMA SURGERIES    none        OTHER EYE SURGERIES    none        CDR    0.85/0.85 thin inf        Tbase    18-22 / 18-24          Tmax    22/24            Ttarget    17/17             HVF    4 test 1999  to  2002 - near nl  od // near nl  Os    more recently - 2 test 2017 to 2020  - near nl od // NL - ? Hint SNS        Gonio    +3 ou         CCT    585/590        OCT    1 test 2017 to 2017 - RNFL - dec. S od // dec. I os        HRT    1 test 2018 to 2018 -  MR -  Dec. Thru out  od // dec. S/N/I os /// CDR 0.87  od // 0.84 os /   // Lrg disc area on HRT  4.2od and 4.2 os (nl is 1/7 to 2.8)        Disc photos   2017, 2020     - Ttoday    18/18 - ran out of gtts // (Tmax / Tbase  22/24 off gtts) (on latanoprost IOP  was 16 ou)   - Test done today    HVF / DFE / photos      2. Lrg disc area    Anomalous disc     PLAN    POAG - mild ou vs suspect   lrg CDR - since childhood  lrg Optic disc area   abnl OCT  abnl HRT   Cont  latanoprost ou   Good resp to latanoprost 22/24 --> 16 ou     1/27/2020 - re-start latanoprost ou - ran out     F/U 4 months with HRT // IOP check back on latanoprost     Will ask photography if they can find any old photos in storage from 1999 to 2002

## 2020-01-27 ENCOUNTER — CLINICAL SUPPORT (OUTPATIENT)
Dept: OPHTHALMOLOGY | Facility: CLINIC | Age: 40
End: 2020-01-27
Payer: COMMERCIAL

## 2020-01-27 ENCOUNTER — OFFICE VISIT (OUTPATIENT)
Dept: OPHTHALMOLOGY | Facility: CLINIC | Age: 40
End: 2020-01-27
Payer: COMMERCIAL

## 2020-01-27 DIAGNOSIS — H47.233 OPTIC CUPPING OF BOTH EYES: ICD-10-CM

## 2020-01-27 DIAGNOSIS — Q14.2 OPTIC DISC ANOMALY: ICD-10-CM

## 2020-01-27 DIAGNOSIS — Z83.511 FAMILY HISTORY OF GLAUCOMA: ICD-10-CM

## 2020-01-27 DIAGNOSIS — H40.1131 PRIMARY OPEN-ANGLE GLAUCOMA, BILATERAL, MILD STAGE: Primary | ICD-10-CM

## 2020-01-27 PROCEDURE — 92014 COMPRE OPH EXAM EST PT 1/>: CPT | Mod: S$GLB,,, | Performed by: OPHTHALMOLOGY

## 2020-01-27 PROCEDURE — 99999 PR PBB SHADOW E&M-EST. PATIENT-LVL II: ICD-10-PCS | Mod: PBBFAC,,, | Performed by: OPHTHALMOLOGY

## 2020-01-27 PROCEDURE — 92083 EXTENDED VISUAL FIELD XM: CPT | Mod: S$GLB,,, | Performed by: OPHTHALMOLOGY

## 2020-01-27 PROCEDURE — 99999 PR PBB SHADOW E&M-EST. PATIENT-LVL II: CPT | Mod: PBBFAC,,, | Performed by: OPHTHALMOLOGY

## 2020-01-27 PROCEDURE — 92250 FUNDUS PHOTOGRAPHY W/I&R: CPT | Mod: S$GLB,,, | Performed by: OPHTHALMOLOGY

## 2020-01-27 PROCEDURE — 92250 COLOR FUNDUS PHOTOGRAPHY - OU - BOTH EYES: ICD-10-PCS | Mod: S$GLB,,, | Performed by: OPHTHALMOLOGY

## 2020-01-27 PROCEDURE — 92083 HUMPHREY VISUAL FIELD - OU - BOTH EYES: ICD-10-PCS | Mod: S$GLB,,, | Performed by: OPHTHALMOLOGY

## 2020-01-27 PROCEDURE — 92014 PR EYE EXAM, EST PATIENT,COMPREHESV: ICD-10-PCS | Mod: S$GLB,,, | Performed by: OPHTHALMOLOGY

## 2020-01-27 RX ORDER — LATANOPROST 50 UG/ML
1 SOLUTION/ DROPS OPHTHALMIC NIGHTLY
Qty: 3 BOTTLE | Refills: 3 | Status: SHIPPED | OUTPATIENT
Start: 2020-01-27 | End: 2020-05-28 | Stop reason: SDUPTHER

## 2020-01-27 RX ORDER — LATANOPROST 50 UG/ML
1 SOLUTION/ DROPS OPHTHALMIC NIGHTLY
Qty: 3 BOTTLE | Refills: 3 | Status: SHIPPED | OUTPATIENT
Start: 2020-01-27 | End: 2020-01-27 | Stop reason: SDUPTHER

## 2020-05-27 NOTE — PROGRESS NOTES
HPI     Glaucoma      Additional comments: IOP ck today and pt states no changes since last   exam              Comments     DLS : 1/27/20    1. POAG  2. Large CDR    MEDS:  Latanoprost QHS OU                      Last edited by Tammi Aldana MA on 5/28/2020  1:29 PM. (History)              Assessment /Plan     For exam results, see Encounter Report.    Primary open-angle glaucoma, bilateral, mild stage    Optic cupping of both eyes    Optic disc anomaly    Family history of glaucoma          PT WAS SEEN HERE AT [facility] - (PRE 2004) - AS A GLAUCOMA SUSPECT AT ABOUT 14 YEARS OF AGE  NO TREATMENT WAS STARTED AT THAT TIME   SHE RE-PRESENTS AS A GLAUCOMA SUSPECT 8/14/2017   SHE IS NOW 36 AND HAS A 2 YEAR OLD CHILD      1. Glaucoma suspect 2/2 large CDR   Seen as a teenager ( about age 14 ) - and there  May be some old slides in storage     Re-presents as continued suspect        Glaucoma (type and duration)    Followed as a supspect since 1999 (age 14)   First HVF   1999   First photos   ?   Treatment / Drops started   Drops starte 12/2017 at age 37           Family history    + grandmother         Glaucoma meds    Latanoprost started 12/2017        H/O adverse rxn to glaucoma drops    none        LASERS    none        GLAUCOMA SURGERIES    none        OTHER EYE SURGERIES    none        CDR    0.85/0.85 thin inf        Tbase    18-22 / 18-24          Tmax    22/24            Ttarget    17/17             HVF    4 test 1999  to  2002 - near nl  od // near nl  Os    more recently - 2 test 2017 to 2020  - near nl od // NL - ? Hint SNS        Gonio    +3 ou         CCT    585/590        OCT    1 test 2017 to 2017 - RNFL - dec. S od // dec. I os        HRT    1 test 2018 to 2018 -  MR -  Dec. Thru out  od // dec. S/N/I os /// CDR 0.87  od // 0.84 os /   // Lrg disc area on HRT  4.2od and 4.2 os (nl is 1/7 to 2.8)        Disc photos   2017, 2020     - Ttoday    19/19 -(Tmax / Tbase  22/24 off gtts)  - Test done today     IOP //  HRT - not done 2/2 covid      2. Lrg disc area    Anomalous disc     PLAN    POAG - mild ou vs suspect   lrg CDR - since childhood  lrg Optic disc area   abnl OCT  abnl HRT   Cont  latanoprost ou   Good resp to latanoprost 22/24 --> 16 - 18  ou     F/U 4 months with OCT     Pt is moving to [place] - but will be back in town for her job occasionally - [facility]     Will ask photography if they can find any old photos in storage from 1999 to 2002

## 2020-05-28 ENCOUNTER — OFFICE VISIT (OUTPATIENT)
Dept: OPHTHALMOLOGY | Facility: CLINIC | Age: 40
End: 2020-05-28
Payer: COMMERCIAL

## 2020-05-28 DIAGNOSIS — H40.1131 PRIMARY OPEN-ANGLE GLAUCOMA, BILATERAL, MILD STAGE: Primary | ICD-10-CM

## 2020-05-28 DIAGNOSIS — Q14.2 OPTIC DISC ANOMALY: ICD-10-CM

## 2020-05-28 DIAGNOSIS — Z83.511 FAMILY HISTORY OF GLAUCOMA: ICD-10-CM

## 2020-05-28 DIAGNOSIS — H47.233 OPTIC CUPPING OF BOTH EYES: ICD-10-CM

## 2020-05-28 PROCEDURE — 99999 PR PBB SHADOW E&M-EST. PATIENT-LVL II: CPT | Mod: PBBFAC,,, | Performed by: OPHTHALMOLOGY

## 2020-05-28 PROCEDURE — 92012 INTRM OPH EXAM EST PATIENT: CPT | Mod: S$GLB,,, | Performed by: OPHTHALMOLOGY

## 2020-05-28 PROCEDURE — 92012 PR EYE EXAM, EST PATIENT,INTERMED: ICD-10-PCS | Mod: S$GLB,,, | Performed by: OPHTHALMOLOGY

## 2020-05-28 PROCEDURE — 99999 PR PBB SHADOW E&M-EST. PATIENT-LVL II: ICD-10-PCS | Mod: PBBFAC,,, | Performed by: OPHTHALMOLOGY

## 2020-05-28 RX ORDER — LATANOPROST 50 UG/ML
1 SOLUTION/ DROPS OPHTHALMIC NIGHTLY
Qty: 3 BOTTLE | Refills: 3 | Status: SHIPPED | OUTPATIENT
Start: 2020-05-28 | End: 2020-08-27 | Stop reason: SDUPTHER

## 2020-06-10 ENCOUNTER — TELEPHONE (OUTPATIENT)
Dept: ORTHOPEDICS | Facility: CLINIC | Age: 40
End: 2020-06-10

## 2020-06-10 NOTE — TELEPHONE ENCOUNTER
Called pt to see if she would like to reschedule appt that was cancelled during the Covid virus. Pt stated that she would not like to reschedule at this time. She will call back as needed.

## 2020-08-27 DIAGNOSIS — H40.1131 PRIMARY OPEN-ANGLE GLAUCOMA, BILATERAL, MILD STAGE: ICD-10-CM

## 2020-08-27 RX ORDER — LATANOPROST 50 UG/ML
1 SOLUTION/ DROPS OPHTHALMIC NIGHTLY
Qty: 3 BOTTLE | Refills: 3 | Status: SHIPPED | OUTPATIENT
Start: 2020-08-27 | End: 2021-10-05 | Stop reason: SDUPTHER

## 2021-10-05 DIAGNOSIS — H40.1131 PRIMARY OPEN-ANGLE GLAUCOMA, BILATERAL, MILD STAGE: ICD-10-CM

## 2021-10-05 RX ORDER — LATANOPROST 50 UG/ML
1 SOLUTION/ DROPS OPHTHALMIC NIGHTLY
Qty: 2.5 ML | Refills: 6 | Status: SHIPPED | OUTPATIENT
Start: 2021-10-05

## 2022-04-22 NOTE — TELEPHONE ENCOUNTER
Patient is covered for Nexplanon through [employer] and [facility]%. [facility] will ship the patient's device once insurance is verified per kaitlyn Ma with [facility].     electronic

## 2023-07-10 NOTE — ADDENDUM NOTE
Addended by: JUAN ALBERTO on: 12/14/2018 10:31 AM     Modules accepted: Orders     No DC instructions